# Patient Record
Sex: FEMALE | Race: WHITE | NOT HISPANIC OR LATINO | URBAN - METROPOLITAN AREA
[De-identification: names, ages, dates, MRNs, and addresses within clinical notes are randomized per-mention and may not be internally consistent; named-entity substitution may affect disease eponyms.]

---

## 2018-05-04 ENCOUNTER — EMERGENCY (EMERGENCY)
Facility: HOSPITAL | Age: 73
LOS: 1 days | Discharge: HOME | End: 2018-05-04
Attending: EMERGENCY MEDICINE
Payer: MEDICARE

## 2018-05-04 VITALS
SYSTOLIC BLOOD PRESSURE: 171 MMHG | HEIGHT: 62 IN | RESPIRATION RATE: 16 BRPM | TEMPERATURE: 99 F | WEIGHT: 123.9 LBS | DIASTOLIC BLOOD PRESSURE: 81 MMHG | HEART RATE: 79 BPM | OXYGEN SATURATION: 97 %

## 2018-05-04 VITALS
DIASTOLIC BLOOD PRESSURE: 61 MMHG | RESPIRATION RATE: 18 BRPM | TEMPERATURE: 98 F | OXYGEN SATURATION: 98 % | HEART RATE: 64 BPM | SYSTOLIC BLOOD PRESSURE: 149 MMHG

## 2018-05-04 LAB
ALBUMIN SERPL ELPH-MCNC: 4 G/DL — SIGNIFICANT CHANGE UP (ref 3.5–5)
ALP SERPL-CCNC: 112 U/L — SIGNIFICANT CHANGE UP (ref 40–120)
ALT FLD-CCNC: 33 U/L DA — SIGNIFICANT CHANGE UP (ref 10–60)
ANION GAP SERPL CALC-SCNC: 8 MMOL/L — SIGNIFICANT CHANGE UP (ref 5–17)
APPEARANCE UR: CLEAR — SIGNIFICANT CHANGE UP
AST SERPL-CCNC: 27 U/L — SIGNIFICANT CHANGE UP (ref 10–40)
BASOPHILS # BLD AUTO: 0.1 K/UL — SIGNIFICANT CHANGE UP (ref 0–0.2)
BASOPHILS NFR BLD AUTO: 1.3 % — SIGNIFICANT CHANGE UP (ref 0–2)
BILIRUB SERPL-MCNC: 0.4 MG/DL — SIGNIFICANT CHANGE UP (ref 0.2–1.2)
BILIRUB UR-MCNC: NEGATIVE — SIGNIFICANT CHANGE UP
BUN SERPL-MCNC: 12 MG/DL — SIGNIFICANT CHANGE UP (ref 7–18)
CALCIUM SERPL-MCNC: 9.1 MG/DL — SIGNIFICANT CHANGE UP (ref 8.4–10.5)
CHLORIDE SERPL-SCNC: 104 MMOL/L — SIGNIFICANT CHANGE UP (ref 96–108)
CO2 SERPL-SCNC: 27 MMOL/L — SIGNIFICANT CHANGE UP (ref 22–31)
COLOR SPEC: YELLOW — SIGNIFICANT CHANGE UP
CREAT SERPL-MCNC: 0.71 MG/DL — SIGNIFICANT CHANGE UP (ref 0.5–1.3)
DIFF PNL FLD: ABNORMAL
EOSINOPHIL # BLD AUTO: 0 K/UL — SIGNIFICANT CHANGE UP (ref 0–0.5)
EOSINOPHIL NFR BLD AUTO: 0.3 % — SIGNIFICANT CHANGE UP (ref 0–6)
GLUCOSE SERPL-MCNC: 96 MG/DL — SIGNIFICANT CHANGE UP (ref 70–99)
GLUCOSE UR QL: NEGATIVE — SIGNIFICANT CHANGE UP
HCT VFR BLD CALC: 39.3 % — SIGNIFICANT CHANGE UP (ref 34.5–45)
HGB BLD-MCNC: 11.3 G/DL — LOW (ref 11.5–15.5)
KETONES UR-MCNC: NEGATIVE — SIGNIFICANT CHANGE UP
LEUKOCYTE ESTERASE UR-ACNC: NEGATIVE — SIGNIFICANT CHANGE UP
LIDOCAIN IGE QN: 98 U/L — SIGNIFICANT CHANGE UP (ref 73–393)
LYMPHOCYTES # BLD AUTO: 1.5 K/UL — SIGNIFICANT CHANGE UP (ref 1–3.3)
LYMPHOCYTES # BLD AUTO: 29.7 % — SIGNIFICANT CHANGE UP (ref 13–44)
MCHC RBC-ENTMCNC: 19.4 PG — LOW (ref 27–34)
MCHC RBC-ENTMCNC: 28.7 GM/DL — LOW (ref 32–36)
MCV RBC AUTO: 67.6 FL — LOW (ref 80–100)
MONOCYTES # BLD AUTO: 0.3 K/UL — SIGNIFICANT CHANGE UP (ref 0–0.9)
MONOCYTES NFR BLD AUTO: 6.5 % — SIGNIFICANT CHANGE UP (ref 2–14)
NEUTROPHILS # BLD AUTO: 3.2 K/UL — SIGNIFICANT CHANGE UP (ref 1.8–7.4)
NEUTROPHILS NFR BLD AUTO: 62.3 % — SIGNIFICANT CHANGE UP (ref 43–77)
NITRITE UR-MCNC: NEGATIVE — SIGNIFICANT CHANGE UP
PH UR: 7 — SIGNIFICANT CHANGE UP (ref 5–8)
PLATELET # BLD AUTO: 221 K/UL — SIGNIFICANT CHANGE UP (ref 150–400)
POTASSIUM SERPL-MCNC: 4.6 MMOL/L — SIGNIFICANT CHANGE UP (ref 3.5–5.3)
POTASSIUM SERPL-SCNC: 4.6 MMOL/L — SIGNIFICANT CHANGE UP (ref 3.5–5.3)
PROT SERPL-MCNC: 8.2 G/DL — SIGNIFICANT CHANGE UP (ref 6–8.3)
PROT UR-MCNC: NEGATIVE — SIGNIFICANT CHANGE UP
RBC # BLD: 5.82 M/UL — HIGH (ref 3.8–5.2)
RBC # FLD: 14 % — SIGNIFICANT CHANGE UP (ref 10.3–14.5)
SODIUM SERPL-SCNC: 139 MMOL/L — SIGNIFICANT CHANGE UP (ref 135–145)
SP GR SPEC: 1.01 — SIGNIFICANT CHANGE UP (ref 1.01–1.02)
UROBILINOGEN FLD QL: NEGATIVE — SIGNIFICANT CHANGE UP
WBC # BLD: 5.2 K/UL — SIGNIFICANT CHANGE UP (ref 3.8–10.5)
WBC # FLD AUTO: 5.2 K/UL — SIGNIFICANT CHANGE UP (ref 3.8–10.5)

## 2018-05-04 PROCEDURE — 99284 EMERGENCY DEPT VISIT MOD MDM: CPT | Mod: 25

## 2018-05-04 PROCEDURE — 80053 COMPREHEN METABOLIC PANEL: CPT

## 2018-05-04 PROCEDURE — 83690 ASSAY OF LIPASE: CPT

## 2018-05-04 PROCEDURE — 81001 URINALYSIS AUTO W/SCOPE: CPT

## 2018-05-04 PROCEDURE — 96374 THER/PROPH/DIAG INJ IV PUSH: CPT | Mod: XU

## 2018-05-04 PROCEDURE — 85027 COMPLETE CBC AUTOMATED: CPT

## 2018-05-04 PROCEDURE — 74176 CT ABD & PELVIS W/O CONTRAST: CPT

## 2018-05-04 PROCEDURE — 96375 TX/PRO/DX INJ NEW DRUG ADDON: CPT

## 2018-05-04 PROCEDURE — 99284 EMERGENCY DEPT VISIT MOD MDM: CPT

## 2018-05-04 PROCEDURE — 74176 CT ABD & PELVIS W/O CONTRAST: CPT | Mod: 26

## 2018-05-04 RX ORDER — SODIUM CHLORIDE 9 MG/ML
3 INJECTION INTRAMUSCULAR; INTRAVENOUS; SUBCUTANEOUS ONCE
Qty: 0 | Refills: 0 | Status: COMPLETED | OUTPATIENT
Start: 2018-05-04 | End: 2018-05-04

## 2018-05-04 RX ORDER — SODIUM CHLORIDE 9 MG/ML
1000 INJECTION INTRAMUSCULAR; INTRAVENOUS; SUBCUTANEOUS ONCE
Qty: 0 | Refills: 0 | Status: COMPLETED | OUTPATIENT
Start: 2018-05-04 | End: 2018-05-04

## 2018-05-04 RX ORDER — KETOROLAC TROMETHAMINE 30 MG/ML
15 SYRINGE (ML) INJECTION ONCE
Qty: 0 | Refills: 0 | Status: DISCONTINUED | OUTPATIENT
Start: 2018-05-04 | End: 2018-05-04

## 2018-05-04 RX ORDER — TRAMADOL HYDROCHLORIDE 50 MG/1
1 TABLET ORAL
Qty: 18 | Refills: 0
Start: 2018-05-04 | End: 2018-05-06

## 2018-05-04 RX ORDER — FAMOTIDINE 10 MG/ML
20 INJECTION INTRAVENOUS ONCE
Qty: 0 | Refills: 0 | Status: COMPLETED | OUTPATIENT
Start: 2018-05-04 | End: 2018-05-04

## 2018-05-04 RX ORDER — ONDANSETRON 8 MG/1
4 TABLET, FILM COATED ORAL ONCE
Qty: 0 | Refills: 0 | Status: COMPLETED | OUTPATIENT
Start: 2018-05-04 | End: 2018-05-04

## 2018-05-04 RX ORDER — ACYCLOVIR SODIUM 500 MG
1 VIAL (EA) INTRAVENOUS
Qty: 35 | Refills: 0
Start: 2018-05-04 | End: 2018-05-10

## 2018-05-04 RX ORDER — MORPHINE SULFATE 50 MG/1
4 CAPSULE, EXTENDED RELEASE ORAL ONCE
Qty: 0 | Refills: 0 | Status: DISCONTINUED | OUTPATIENT
Start: 2018-05-04 | End: 2018-05-04

## 2018-05-04 RX ADMIN — MORPHINE SULFATE 4 MILLIGRAM(S): 50 CAPSULE, EXTENDED RELEASE ORAL at 07:09

## 2018-05-04 RX ADMIN — SODIUM CHLORIDE 1000 MILLILITER(S): 9 INJECTION INTRAMUSCULAR; INTRAVENOUS; SUBCUTANEOUS at 06:40

## 2018-05-04 RX ADMIN — SODIUM CHLORIDE 3 MILLILITER(S): 9 INJECTION INTRAMUSCULAR; INTRAVENOUS; SUBCUTANEOUS at 06:37

## 2018-05-04 RX ADMIN — FAMOTIDINE 104 MILLIGRAM(S): 10 INJECTION INTRAVENOUS at 06:43

## 2018-05-04 RX ADMIN — MORPHINE SULFATE 4 MILLIGRAM(S): 50 CAPSULE, EXTENDED RELEASE ORAL at 06:39

## 2018-05-04 RX ADMIN — Medication 15 MILLIGRAM(S): at 07:09

## 2018-05-04 RX ADMIN — Medication 15 MILLIGRAM(S): at 06:39

## 2018-05-04 RX ADMIN — ONDANSETRON 4 MILLIGRAM(S): 8 TABLET, FILM COATED ORAL at 06:39

## 2018-05-04 NOTE — ED PROVIDER NOTE - OBJECTIVE STATEMENT
72 year old female came to the ED because of right flank/abd pain. The pain started 1 day ago and she was seen by her pmd. She describes the pain as burning and is unable to find a comfortable position when she has the pain. The pain is associated with nausea. No fever, no rash, no urinary complaints, no chills, no vomiting, no chest pain, no sob, no back pain. She has had colonoscopy and endoscopy in the past.

## 2018-05-04 NOTE — ED PROVIDER NOTE - PROGRESS NOTE DETAILS
Pt with left abd pain, says her skin is burning, no rash, but likely shingles Pt with left abd pain, says her skin is burning, no rash, but likely shingles. She says that she always has some microscopic blood in the urine. Will treat for shingles and recommend close follow up with pmd.

## 2018-05-05 ENCOUNTER — EMERGENCY (EMERGENCY)
Facility: HOSPITAL | Age: 73
LOS: 1 days | Discharge: HOME | End: 2018-05-05
Attending: EMERGENCY MEDICINE
Payer: MEDICARE

## 2018-05-05 VITALS
DIASTOLIC BLOOD PRESSURE: 60 MMHG | OXYGEN SATURATION: 99 % | RESPIRATION RATE: 16 BRPM | SYSTOLIC BLOOD PRESSURE: 140 MMHG | HEART RATE: 73 BPM | TEMPERATURE: 98 F

## 2018-05-05 VITALS
SYSTOLIC BLOOD PRESSURE: 158 MMHG | OXYGEN SATURATION: 98 % | DIASTOLIC BLOOD PRESSURE: 72 MMHG | TEMPERATURE: 98 F | HEART RATE: 69 BPM | RESPIRATION RATE: 18 BRPM

## 2018-05-05 LAB
ALBUMIN SERPL ELPH-MCNC: 4 G/DL — SIGNIFICANT CHANGE UP (ref 3.5–5)
ALP SERPL-CCNC: 118 U/L — SIGNIFICANT CHANGE UP (ref 40–120)
ALT FLD-CCNC: 27 U/L DA — SIGNIFICANT CHANGE UP (ref 10–60)
ANION GAP SERPL CALC-SCNC: 7 MMOL/L — SIGNIFICANT CHANGE UP (ref 5–17)
APPEARANCE UR: CLEAR — SIGNIFICANT CHANGE UP
AST SERPL-CCNC: 24 U/L — SIGNIFICANT CHANGE UP (ref 10–40)
BASOPHILS # BLD AUTO: 0.1 K/UL — SIGNIFICANT CHANGE UP (ref 0–0.2)
BASOPHILS NFR BLD AUTO: 0.6 % — SIGNIFICANT CHANGE UP (ref 0–2)
BILIRUB SERPL-MCNC: 0.7 MG/DL — SIGNIFICANT CHANGE UP (ref 0.2–1.2)
BILIRUB UR-MCNC: NEGATIVE — SIGNIFICANT CHANGE UP
BUN SERPL-MCNC: 14 MG/DL — SIGNIFICANT CHANGE UP (ref 7–18)
CALCIUM SERPL-MCNC: 9.2 MG/DL — SIGNIFICANT CHANGE UP (ref 8.4–10.5)
CHLORIDE SERPL-SCNC: 96 MMOL/L — SIGNIFICANT CHANGE UP (ref 96–108)
CO2 SERPL-SCNC: 28 MMOL/L — SIGNIFICANT CHANGE UP (ref 22–31)
COLOR SPEC: YELLOW — SIGNIFICANT CHANGE UP
CREAT SERPL-MCNC: 0.65 MG/DL — SIGNIFICANT CHANGE UP (ref 0.5–1.3)
DIFF PNL FLD: ABNORMAL
EOSINOPHIL # BLD AUTO: 0 K/UL — SIGNIFICANT CHANGE UP (ref 0–0.5)
EOSINOPHIL NFR BLD AUTO: 0.3 % — SIGNIFICANT CHANGE UP (ref 0–6)
GLUCOSE SERPL-MCNC: 116 MG/DL — HIGH (ref 70–99)
GLUCOSE UR QL: NEGATIVE — SIGNIFICANT CHANGE UP
HCT VFR BLD CALC: 40.2 % — SIGNIFICANT CHANGE UP (ref 34.5–45)
HGB BLD-MCNC: 11.9 G/DL — SIGNIFICANT CHANGE UP (ref 11.5–15.5)
KETONES UR-MCNC: ABNORMAL
LEUKOCYTE ESTERASE UR-ACNC: ABNORMAL
LIDOCAIN IGE QN: 96 U/L — SIGNIFICANT CHANGE UP (ref 73–393)
LYMPHOCYTES # BLD AUTO: 1.9 K/UL — SIGNIFICANT CHANGE UP (ref 1–3.3)
LYMPHOCYTES # BLD AUTO: 18.3 % — SIGNIFICANT CHANGE UP (ref 13–44)
MAGNESIUM SERPL-MCNC: 2 MG/DL — SIGNIFICANT CHANGE UP (ref 1.6–2.6)
MCHC RBC-ENTMCNC: 19.8 PG — LOW (ref 27–34)
MCHC RBC-ENTMCNC: 29.6 GM/DL — LOW (ref 32–36)
MCV RBC AUTO: 67 FL — LOW (ref 80–100)
MONOCYTES # BLD AUTO: 0.6 K/UL — SIGNIFICANT CHANGE UP (ref 0–0.9)
MONOCYTES NFR BLD AUTO: 5.3 % — SIGNIFICANT CHANGE UP (ref 2–14)
NEUTROPHILS # BLD AUTO: 7.8 K/UL — HIGH (ref 1.8–7.4)
NEUTROPHILS NFR BLD AUTO: 75.5 % — SIGNIFICANT CHANGE UP (ref 43–77)
NITRITE UR-MCNC: NEGATIVE — SIGNIFICANT CHANGE UP
PH UR: 5 — SIGNIFICANT CHANGE UP (ref 5–8)
PHOSPHATE SERPL-MCNC: 2.8 MG/DL — SIGNIFICANT CHANGE UP (ref 2.5–4.5)
PLATELET # BLD AUTO: 271 K/UL — SIGNIFICANT CHANGE UP (ref 150–400)
POTASSIUM SERPL-MCNC: 4.1 MMOL/L — SIGNIFICANT CHANGE UP (ref 3.5–5.3)
POTASSIUM SERPL-SCNC: 4.1 MMOL/L — SIGNIFICANT CHANGE UP (ref 3.5–5.3)
PROT SERPL-MCNC: 8.5 G/DL — HIGH (ref 6–8.3)
PROT UR-MCNC: 15
RBC # BLD: 6 M/UL — HIGH (ref 3.8–5.2)
RBC # FLD: 13.7 % — SIGNIFICANT CHANGE UP (ref 10.3–14.5)
SODIUM SERPL-SCNC: 131 MMOL/L — LOW (ref 135–145)
SP GR SPEC: 1.03 — HIGH (ref 1.01–1.02)
UROBILINOGEN FLD QL: NEGATIVE — SIGNIFICANT CHANGE UP
WBC # BLD: 10.2 K/UL — SIGNIFICANT CHANGE UP (ref 3.8–10.5)
WBC # FLD AUTO: 10.2 K/UL — SIGNIFICANT CHANGE UP (ref 3.8–10.5)

## 2018-05-05 PROCEDURE — 83735 ASSAY OF MAGNESIUM: CPT

## 2018-05-05 PROCEDURE — 83690 ASSAY OF LIPASE: CPT

## 2018-05-05 PROCEDURE — 85027 COMPLETE CBC AUTOMATED: CPT

## 2018-05-05 PROCEDURE — 74177 CT ABD & PELVIS W/CONTRAST: CPT | Mod: 26

## 2018-05-05 PROCEDURE — 74177 CT ABD & PELVIS W/CONTRAST: CPT

## 2018-05-05 PROCEDURE — 99285 EMERGENCY DEPT VISIT HI MDM: CPT

## 2018-05-05 PROCEDURE — 99284 EMERGENCY DEPT VISIT MOD MDM: CPT | Mod: 25

## 2018-05-05 PROCEDURE — 96375 TX/PRO/DX INJ NEW DRUG ADDON: CPT

## 2018-05-05 PROCEDURE — 81001 URINALYSIS AUTO W/SCOPE: CPT

## 2018-05-05 PROCEDURE — 84100 ASSAY OF PHOSPHORUS: CPT

## 2018-05-05 PROCEDURE — 96374 THER/PROPH/DIAG INJ IV PUSH: CPT

## 2018-05-05 PROCEDURE — 80053 COMPREHEN METABOLIC PANEL: CPT

## 2018-05-05 RX ORDER — SODIUM CHLORIDE 9 MG/ML
1000 INJECTION INTRAMUSCULAR; INTRAVENOUS; SUBCUTANEOUS ONCE
Qty: 0 | Refills: 0 | Status: COMPLETED | OUTPATIENT
Start: 2018-05-05 | End: 2018-05-05

## 2018-05-05 RX ORDER — FAMOTIDINE 10 MG/ML
20 INJECTION INTRAVENOUS ONCE
Qty: 0 | Refills: 0 | Status: DISCONTINUED | OUTPATIENT
Start: 2018-05-05 | End: 2018-05-05

## 2018-05-05 RX ORDER — ONDANSETRON 8 MG/1
4 TABLET, FILM COATED ORAL ONCE
Qty: 0 | Refills: 0 | Status: COMPLETED | OUTPATIENT
Start: 2018-05-05 | End: 2018-05-05

## 2018-05-05 RX ORDER — FAMOTIDINE 10 MG/ML
20 INJECTION INTRAVENOUS ONCE
Qty: 0 | Refills: 0 | Status: COMPLETED | OUTPATIENT
Start: 2018-05-05 | End: 2018-05-05

## 2018-05-05 RX ADMIN — SODIUM CHLORIDE 1000 MILLILITER(S): 9 INJECTION INTRAMUSCULAR; INTRAVENOUS; SUBCUTANEOUS at 20:34

## 2018-05-05 RX ADMIN — ONDANSETRON 4 MILLIGRAM(S): 8 TABLET, FILM COATED ORAL at 20:42

## 2018-05-05 RX ADMIN — FAMOTIDINE 104 MILLIGRAM(S): 10 INJECTION INTRAVENOUS at 20:42

## 2018-05-05 NOTE — ED PROVIDER NOTE - MEDICAL DECISION MAKING DETAILS
73 y/o F pt presents with persistent abdominal pain and vomiting. Will r/o diverticulitis versus colitis. Will check labs. Pt to obtain CT abd/pelvis. Will administer IVFs, GI cocktail, pain medications and reassess.

## 2018-05-05 NOTE — ED PROVIDER NOTE - PROGRESS NOTE DETAILS
CT negative for acute pathology.  Pt already on anti viral medication for possible early shingles.  pt advised to continue medication and follow up with pmd.

## 2018-05-05 NOTE — ED PROVIDER NOTE - OBJECTIVE STATEMENT
73 y/o F pt with PMHx of GERD and HLD and no significant PSHx presents to the ED with persistent vomiting since yesterday. Pt was seen in hospital yesterday for abdominal pain, nausea and vomiting; non-contrast CT scan of abdomen and pelvis was negative for kidney stones, and pt was told she possibly has early shingles and discharged with anti-viral medications. Pt reports abdominal pain has been greater on the left side. Pt is now with persistent abdominal pain and vomiting. Pt reports inability to keep down food. Pt denies rash, or any other complaints. NKDA.

## 2018-05-05 NOTE — ED ADULT NURSE NOTE - OBJECTIVE STATEMENT
states  came today due to vomiting since yesterday denies abdominal pain, denies diarrhea. No active vomiting at this time .

## 2018-05-10 ENCOUNTER — EMERGENCY (EMERGENCY)
Facility: HOSPITAL | Age: 73
LOS: 1 days | Discharge: HOME | End: 2018-05-10
Attending: EMERGENCY MEDICINE
Payer: MEDICARE

## 2018-05-10 VITALS
DIASTOLIC BLOOD PRESSURE: 81 MMHG | HEART RATE: 106 BPM | RESPIRATION RATE: 16 BRPM | WEIGHT: 119.93 LBS | TEMPERATURE: 98 F | OXYGEN SATURATION: 97 % | SYSTOLIC BLOOD PRESSURE: 131 MMHG

## 2018-05-10 VITALS
OXYGEN SATURATION: 98 % | TEMPERATURE: 99 F | SYSTOLIC BLOOD PRESSURE: 146 MMHG | DIASTOLIC BLOOD PRESSURE: 62 MMHG | HEART RATE: 87 BPM | RESPIRATION RATE: 16 BRPM

## 2018-05-10 LAB
ALBUMIN SERPL ELPH-MCNC: 3.7 G/DL — SIGNIFICANT CHANGE UP (ref 3.5–5)
ALP SERPL-CCNC: 124 U/L — HIGH (ref 40–120)
ALT FLD-CCNC: 21 U/L DA — SIGNIFICANT CHANGE UP (ref 10–60)
ANION GAP SERPL CALC-SCNC: 8 MMOL/L — SIGNIFICANT CHANGE UP (ref 5–17)
APPEARANCE UR: CLEAR — SIGNIFICANT CHANGE UP
APTT BLD: 27.9 SEC — SIGNIFICANT CHANGE UP (ref 27.5–37.4)
AST SERPL-CCNC: 19 U/L — SIGNIFICANT CHANGE UP (ref 10–40)
BACTERIA # UR AUTO: NEGATIVE /HPF — SIGNIFICANT CHANGE UP
BASOPHILS # BLD AUTO: 0.1 K/UL — SIGNIFICANT CHANGE UP (ref 0–0.2)
BASOPHILS NFR BLD AUTO: 1 % — SIGNIFICANT CHANGE UP (ref 0–2)
BILIRUB SERPL-MCNC: 0.7 MG/DL — SIGNIFICANT CHANGE UP (ref 0.2–1.2)
BILIRUB UR-MCNC: NEGATIVE — SIGNIFICANT CHANGE UP
BUN SERPL-MCNC: 12 MG/DL — SIGNIFICANT CHANGE UP (ref 7–18)
CALCIUM SERPL-MCNC: 9.3 MG/DL — SIGNIFICANT CHANGE UP (ref 8.4–10.5)
CHLORIDE SERPL-SCNC: 100 MMOL/L — SIGNIFICANT CHANGE UP (ref 96–108)
CO2 SERPL-SCNC: 30 MMOL/L — SIGNIFICANT CHANGE UP (ref 22–31)
COLOR SPEC: YELLOW — SIGNIFICANT CHANGE UP
CREAT SERPL-MCNC: 0.65 MG/DL — SIGNIFICANT CHANGE UP (ref 0.5–1.3)
DIFF PNL FLD: ABNORMAL
EOSINOPHIL # BLD AUTO: 0.1 K/UL — SIGNIFICANT CHANGE UP (ref 0–0.5)
EOSINOPHIL NFR BLD AUTO: 1.4 % — SIGNIFICANT CHANGE UP (ref 0–6)
EPI CELLS # UR: ABNORMAL /HPF
GLUCOSE SERPL-MCNC: 99 MG/DL — SIGNIFICANT CHANGE UP (ref 70–99)
GLUCOSE UR QL: NEGATIVE — SIGNIFICANT CHANGE UP
HCT VFR BLD CALC: 40.3 % — SIGNIFICANT CHANGE UP (ref 34.5–45)
HGB BLD-MCNC: 12.1 G/DL — SIGNIFICANT CHANGE UP (ref 11.5–15.5)
HYPOCHROMIA BLD QL: SLIGHT — SIGNIFICANT CHANGE UP
INR BLD: 1.09 RATIO — SIGNIFICANT CHANGE UP (ref 0.88–1.16)
KETONES UR-MCNC: NEGATIVE — SIGNIFICANT CHANGE UP
LEUKOCYTE ESTERASE UR-ACNC: NEGATIVE — SIGNIFICANT CHANGE UP
LIDOCAIN IGE QN: 104 U/L — SIGNIFICANT CHANGE UP (ref 73–393)
LYMPHOCYTES # BLD AUTO: 1.9 K/UL — SIGNIFICANT CHANGE UP (ref 1–3.3)
LYMPHOCYTES # BLD AUTO: 25.3 % — SIGNIFICANT CHANGE UP (ref 13–44)
MCHC RBC-ENTMCNC: 20.1 PG — LOW (ref 27–34)
MCHC RBC-ENTMCNC: 30 GM/DL — LOW (ref 32–36)
MCV RBC AUTO: 66.8 FL — LOW (ref 80–100)
MICROCYTES BLD QL: SIGNIFICANT CHANGE UP
MONOCYTES # BLD AUTO: 0.7 K/UL — SIGNIFICANT CHANGE UP (ref 0–0.9)
MONOCYTES NFR BLD AUTO: 9.1 % — SIGNIFICANT CHANGE UP (ref 2–14)
NEUTROPHILS # BLD AUTO: 4.8 K/UL — SIGNIFICANT CHANGE UP (ref 1.8–7.4)
NEUTROPHILS NFR BLD AUTO: 63.2 % — SIGNIFICANT CHANGE UP (ref 43–77)
NITRITE UR-MCNC: NEGATIVE — SIGNIFICANT CHANGE UP
OVALOCYTES BLD QL SMEAR: SIGNIFICANT CHANGE UP
PH UR: 5 — SIGNIFICANT CHANGE UP (ref 5–8)
PLAT MORPH BLD: NORMAL — SIGNIFICANT CHANGE UP
PLATELET # BLD AUTO: 269 K/UL — SIGNIFICANT CHANGE UP (ref 150–400)
POTASSIUM SERPL-MCNC: 3.4 MMOL/L — LOW (ref 3.5–5.3)
POTASSIUM SERPL-SCNC: 3.4 MMOL/L — LOW (ref 3.5–5.3)
PROT SERPL-MCNC: 8.6 G/DL — HIGH (ref 6–8.3)
PROT UR-MCNC: NEGATIVE — SIGNIFICANT CHANGE UP
PROTHROM AB SERPL-ACNC: 11.9 SEC — SIGNIFICANT CHANGE UP (ref 9.8–12.7)
RBC # BLD: 6.03 M/UL — HIGH (ref 3.8–5.2)
RBC # FLD: 13.9 % — SIGNIFICANT CHANGE UP (ref 10.3–14.5)
RBC BLD AUTO: ABNORMAL
RBC CASTS # UR COMP ASSIST: NEGATIVE /HPF — SIGNIFICANT CHANGE UP (ref 0–2)
SODIUM SERPL-SCNC: 138 MMOL/L — SIGNIFICANT CHANGE UP (ref 135–145)
SP GR SPEC: 1.02 — SIGNIFICANT CHANGE UP (ref 1.01–1.02)
UROBILINOGEN FLD QL: NEGATIVE — SIGNIFICANT CHANGE UP
WBC # BLD: 7.6 K/UL — SIGNIFICANT CHANGE UP (ref 3.8–10.5)
WBC # FLD AUTO: 7.6 K/UL — SIGNIFICANT CHANGE UP (ref 3.8–10.5)
WBC UR QL: SIGNIFICANT CHANGE UP /HPF (ref 0–5)

## 2018-05-10 PROCEDURE — 96374 THER/PROPH/DIAG INJ IV PUSH: CPT

## 2018-05-10 PROCEDURE — 85027 COMPLETE CBC AUTOMATED: CPT

## 2018-05-10 PROCEDURE — 85730 THROMBOPLASTIN TIME PARTIAL: CPT

## 2018-05-10 PROCEDURE — 81001 URINALYSIS AUTO W/SCOPE: CPT

## 2018-05-10 PROCEDURE — 93005 ELECTROCARDIOGRAM TRACING: CPT

## 2018-05-10 PROCEDURE — 99284 EMERGENCY DEPT VISIT MOD MDM: CPT | Mod: 25

## 2018-05-10 PROCEDURE — 83690 ASSAY OF LIPASE: CPT

## 2018-05-10 PROCEDURE — 99283 EMERGENCY DEPT VISIT LOW MDM: CPT

## 2018-05-10 PROCEDURE — 80053 COMPREHEN METABOLIC PANEL: CPT

## 2018-05-10 PROCEDURE — 85610 PROTHROMBIN TIME: CPT

## 2018-05-10 RX ORDER — SODIUM CHLORIDE 9 MG/ML
1000 INJECTION INTRAMUSCULAR; INTRAVENOUS; SUBCUTANEOUS ONCE
Qty: 0 | Refills: 0 | Status: COMPLETED | OUTPATIENT
Start: 2018-05-10 | End: 2018-05-10

## 2018-05-10 RX ORDER — FAMOTIDINE 10 MG/ML
20 INJECTION INTRAVENOUS ONCE
Qty: 0 | Refills: 0 | Status: DISCONTINUED | OUTPATIENT
Start: 2018-05-10 | End: 2018-05-10

## 2018-05-10 RX ORDER — FAMOTIDINE 10 MG/ML
1 INJECTION INTRAVENOUS
Qty: 14 | Refills: 0
Start: 2018-05-10

## 2018-05-10 RX ORDER — FAMOTIDINE 10 MG/ML
20 INJECTION INTRAVENOUS DAILY
Qty: 0 | Refills: 0 | Status: DISCONTINUED | OUTPATIENT
Start: 2018-05-10 | End: 2018-05-14

## 2018-05-10 RX ADMIN — SODIUM CHLORIDE 1000 MILLILITER(S): 9 INJECTION INTRAMUSCULAR; INTRAVENOUS; SUBCUTANEOUS at 09:19

## 2018-05-10 RX ADMIN — FAMOTIDINE 104 MILLIGRAM(S): 10 INJECTION INTRAVENOUS at 09:43

## 2018-05-10 RX ADMIN — Medication 10 MILLILITER(S): at 09:21

## 2018-05-10 RX ADMIN — Medication 30 MILLILITER(S): at 09:19

## 2018-05-10 NOTE — ED ADULT NURSE NOTE - CAS EDN DISCHARGE ASSESSMENT
Alert and oriented to person, place and time/Symptoms improved/Awake/No adverse reaction to first time med in ED

## 2018-05-10 NOTE — ED ADULT NURSE NOTE - CHPI ED SYMPTOMS NEG
no vomiting/no fever/no blood in stool/no burning urination/no diarrhea/no chills/no abdominal distension/no dysuria/no hematuria

## 2018-05-10 NOTE — ED PROVIDER NOTE - OBJECTIVE STATEMENT
71 y/o F pt with a significant PMHx of migraines and no significant PSHx presents to the ED c/o 1 week of burning pain to L side. Pt was seen twice in the ED and initially diagnosed with "internal shingles". Pt was started on antiviral meds which pt states somewhat helped her pain, but pt also feels as if her meds may have hurt her stomach. Pt vomited during previous ED visits and is currently c/o pain; pt is on Tramadol Pt denies fever, chills, diarrhea, cough, chest pain or any other complaints. NKDA.

## 2018-05-10 NOTE — ED ADULT NURSE NOTE - OBJECTIVE STATEMENT
Pt states has been seen here x 2 days this week, was discharged home with internal shingles, states has stomach pain due to meds//Valtrex  Medicated pt, verbalizes feeling better

## 2018-05-10 NOTE — ED PROVIDER NOTE - MEDICAL DECISION MAKING DETAILS
73 y/o F pt presents with L sided pain; concerned for shingles/ gastritis. Will give GI cocktail, do labs and reassess.

## 2019-08-30 ENCOUNTER — INPATIENT (INPATIENT)
Facility: HOSPITAL | Age: 74
LOS: 3 days | Discharge: HOME | DRG: 872 | End: 2019-09-03
Attending: INTERNAL MEDICINE | Admitting: INTERNAL MEDICINE
Payer: MEDICARE

## 2019-08-30 VITALS
SYSTOLIC BLOOD PRESSURE: 129 MMHG | TEMPERATURE: 100 F | HEIGHT: 62 IN | RESPIRATION RATE: 18 BRPM | HEART RATE: 123 BPM | OXYGEN SATURATION: 98 % | DIASTOLIC BLOOD PRESSURE: 74 MMHG | WEIGHT: 113.98 LBS

## 2019-08-30 DIAGNOSIS — R26.81 UNSTEADINESS ON FEET: ICD-10-CM

## 2019-08-30 PROBLEM — E78.5 HYPERLIPIDEMIA, UNSPECIFIED: Chronic | Status: ACTIVE | Noted: 2018-05-05

## 2019-08-30 PROBLEM — K21.9 GASTRO-ESOPHAGEAL REFLUX DISEASE WITHOUT ESOPHAGITIS: Chronic | Status: ACTIVE | Noted: 2018-05-05

## 2019-08-30 LAB
ALBUMIN SERPL ELPH-MCNC: 3.4 G/DL — LOW (ref 3.5–5)
ALP SERPL-CCNC: 96 U/L — SIGNIFICANT CHANGE UP (ref 40–120)
ALT FLD-CCNC: 22 U/L DA — SIGNIFICANT CHANGE UP (ref 10–60)
ANION GAP SERPL CALC-SCNC: 5 MMOL/L — SIGNIFICANT CHANGE UP (ref 5–17)
APPEARANCE UR: CLEAR — SIGNIFICANT CHANGE UP
APTT BLD: 30.3 SEC — SIGNIFICANT CHANGE UP (ref 27.5–36.3)
AST SERPL-CCNC: 20 U/L — SIGNIFICANT CHANGE UP (ref 10–40)
BASOPHILS # BLD AUTO: 0 K/UL — SIGNIFICANT CHANGE UP (ref 0–0.2)
BASOPHILS NFR BLD AUTO: 0 % — SIGNIFICANT CHANGE UP (ref 0–2)
BILIRUB SERPL-MCNC: 0.4 MG/DL — SIGNIFICANT CHANGE UP (ref 0.2–1.2)
BILIRUB UR-MCNC: NEGATIVE — SIGNIFICANT CHANGE UP
BUN SERPL-MCNC: 19 MG/DL — HIGH (ref 7–18)
CALCIUM SERPL-MCNC: 8.9 MG/DL — SIGNIFICANT CHANGE UP (ref 8.4–10.5)
CHLORIDE SERPL-SCNC: 102 MMOL/L — SIGNIFICANT CHANGE UP (ref 96–108)
CO2 SERPL-SCNC: 30 MMOL/L — SIGNIFICANT CHANGE UP (ref 22–31)
COLOR SPEC: YELLOW — SIGNIFICANT CHANGE UP
CREAT SERPL-MCNC: 0.9 MG/DL — SIGNIFICANT CHANGE UP (ref 0.5–1.3)
DIFF PNL FLD: ABNORMAL
EOSINOPHIL # BLD AUTO: 0 K/UL — SIGNIFICANT CHANGE UP (ref 0–0.5)
EOSINOPHIL NFR BLD AUTO: 0 % — SIGNIFICANT CHANGE UP (ref 0–6)
GLUCOSE SERPL-MCNC: 134 MG/DL — HIGH (ref 70–99)
GLUCOSE UR QL: NEGATIVE — SIGNIFICANT CHANGE UP
HCT VFR BLD CALC: 32.8 % — LOW (ref 34.5–45)
HGB BLD-MCNC: 10.3 G/DL — LOW (ref 11.5–15.5)
INR BLD: 1.23 RATIO — HIGH (ref 0.88–1.16)
KETONES UR-MCNC: ABNORMAL
LACTATE SERPL-SCNC: 1 MMOL/L — SIGNIFICANT CHANGE UP (ref 0.7–2)
LEUKOCYTE ESTERASE UR-ACNC: ABNORMAL
LIDOCAIN IGE QN: 83 U/L — SIGNIFICANT CHANGE UP (ref 73–393)
LYMPHOCYTES # BLD AUTO: 1.31 K/UL — SIGNIFICANT CHANGE UP (ref 1–3.3)
LYMPHOCYTES # BLD AUTO: 16 % — SIGNIFICANT CHANGE UP (ref 13–44)
MCHC RBC-ENTMCNC: 20.3 PG — LOW (ref 27–34)
MCHC RBC-ENTMCNC: 31.4 GM/DL — LOW (ref 32–36)
MCV RBC AUTO: 64.7 FL — LOW (ref 80–100)
MONOCYTES # BLD AUTO: 0.49 K/UL — SIGNIFICANT CHANGE UP (ref 0–0.9)
MONOCYTES NFR BLD AUTO: 6 % — SIGNIFICANT CHANGE UP (ref 2–14)
NEUTROPHILS # BLD AUTO: 6.39 K/UL — SIGNIFICANT CHANGE UP (ref 1.8–7.4)
NEUTROPHILS NFR BLD AUTO: 74 % — SIGNIFICANT CHANGE UP (ref 43–77)
NITRITE UR-MCNC: NEGATIVE — SIGNIFICANT CHANGE UP
PH UR: 5 — SIGNIFICANT CHANGE UP (ref 5–8)
PLATELET # BLD AUTO: 175 K/UL — SIGNIFICANT CHANGE UP (ref 150–400)
POTASSIUM SERPL-MCNC: 4.3 MMOL/L — SIGNIFICANT CHANGE UP (ref 3.5–5.3)
POTASSIUM SERPL-SCNC: 4.3 MMOL/L — SIGNIFICANT CHANGE UP (ref 3.5–5.3)
PROT SERPL-MCNC: 7.7 G/DL — SIGNIFICANT CHANGE UP (ref 6–8.3)
PROT UR-MCNC: 30 MG/DL
PROTHROM AB SERPL-ACNC: 13.7 SEC — HIGH (ref 10–12.9)
RBC # BLD: 5.07 M/UL — SIGNIFICANT CHANGE UP (ref 3.8–5.2)
RBC # FLD: 15.6 % — HIGH (ref 10.3–14.5)
SODIUM SERPL-SCNC: 137 MMOL/L — SIGNIFICANT CHANGE UP (ref 135–145)
SP GR SPEC: 1.01 — SIGNIFICANT CHANGE UP (ref 1.01–1.02)
TROPONIN I SERPL-MCNC: <0.015 NG/ML — SIGNIFICANT CHANGE UP (ref 0–0.04)
UROBILINOGEN FLD QL: NEGATIVE — SIGNIFICANT CHANGE UP
WBC # BLD: 8.19 K/UL — SIGNIFICANT CHANGE UP (ref 3.8–10.5)
WBC # FLD AUTO: 8.19 K/UL — SIGNIFICANT CHANGE UP (ref 3.8–10.5)

## 2019-08-30 PROCEDURE — 70498 CT ANGIOGRAPHY NECK: CPT | Mod: 26

## 2019-08-30 PROCEDURE — 71045 X-RAY EXAM CHEST 1 VIEW: CPT | Mod: 26

## 2019-08-30 PROCEDURE — 99285 EMERGENCY DEPT VISIT HI MDM: CPT

## 2019-08-30 PROCEDURE — 70496 CT ANGIOGRAPHY HEAD: CPT | Mod: 26

## 2019-08-30 RX ORDER — VANCOMYCIN HCL 1 G
1000 VIAL (EA) INTRAVENOUS ONCE
Refills: 0 | Status: COMPLETED | OUTPATIENT
Start: 2019-08-30 | End: 2019-08-30

## 2019-08-30 RX ORDER — ASPIRIN/CALCIUM CARB/MAGNESIUM 324 MG
325 TABLET ORAL ONCE
Refills: 0 | Status: COMPLETED | OUTPATIENT
Start: 2019-08-30 | End: 2019-08-30

## 2019-08-30 RX ORDER — PIPERACILLIN AND TAZOBACTAM 4; .5 G/20ML; G/20ML
3.38 INJECTION, POWDER, LYOPHILIZED, FOR SOLUTION INTRAVENOUS ONCE
Refills: 0 | Status: COMPLETED | OUTPATIENT
Start: 2019-08-30 | End: 2019-08-30

## 2019-08-30 RX ORDER — SODIUM CHLORIDE 9 MG/ML
1000 INJECTION INTRAMUSCULAR; INTRAVENOUS; SUBCUTANEOUS ONCE
Refills: 0 | Status: DISCONTINUED | OUTPATIENT
Start: 2019-08-30 | End: 2019-08-30

## 2019-08-30 RX ORDER — ACETAMINOPHEN 500 MG
975 TABLET ORAL ONCE
Refills: 0 | Status: COMPLETED | OUTPATIENT
Start: 2019-08-30 | End: 2019-08-30

## 2019-08-30 RX ORDER — HEPARIN SODIUM 5000 [USP'U]/ML
5000 INJECTION INTRAVENOUS; SUBCUTANEOUS EVERY 12 HOURS
Refills: 0 | Status: DISCONTINUED | OUTPATIENT
Start: 2019-08-30 | End: 2019-09-03

## 2019-08-30 RX ORDER — SODIUM CHLORIDE 9 MG/ML
1600 INJECTION INTRAMUSCULAR; INTRAVENOUS; SUBCUTANEOUS ONCE
Refills: 0 | Status: COMPLETED | OUTPATIENT
Start: 2019-08-30 | End: 2019-08-30

## 2019-08-30 RX ORDER — SODIUM CHLORIDE 9 MG/ML
1000 INJECTION INTRAMUSCULAR; INTRAVENOUS; SUBCUTANEOUS
Refills: 0 | Status: DISCONTINUED | OUTPATIENT
Start: 2019-08-30 | End: 2019-09-02

## 2019-08-30 RX ADMIN — Medication 325 MILLIGRAM(S): at 21:33

## 2019-08-30 RX ADMIN — Medication 975 MILLIGRAM(S): at 16:57

## 2019-08-30 RX ADMIN — Medication 975 MILLIGRAM(S): at 19:21

## 2019-08-30 RX ADMIN — SODIUM CHLORIDE 1600 MILLILITER(S): 9 INJECTION INTRAMUSCULAR; INTRAVENOUS; SUBCUTANEOUS at 18:18

## 2019-08-30 RX ADMIN — SODIUM CHLORIDE 1600 MILLILITER(S): 9 INJECTION INTRAMUSCULAR; INTRAVENOUS; SUBCUTANEOUS at 16:59

## 2019-08-30 RX ADMIN — Medication 250 MILLIGRAM(S): at 18:18

## 2019-08-30 RX ADMIN — PIPERACILLIN AND TAZOBACTAM 200 GRAM(S): 4; .5 INJECTION, POWDER, LYOPHILIZED, FOR SOLUTION INTRAVENOUS at 16:57

## 2019-08-30 NOTE — H&P ADULT - NSHPPHYSICALEXAM_GEN_ALL_CORE
Vital Signs Last 24 Hrs  T(C): 36.9 (31 Aug 2019 01:00), Max: 38.9 (30 Aug 2019 16:17)  T(F): 98.5 (31 Aug 2019 01:00), Max: 102.1 (30 Aug 2019 16:17)  HR: 98 (31 Aug 2019 01:00) (94 - 123)  BP: 108/67 (31 Aug 2019 01:00) (108/67 - 129/74)  BP(mean): --  RR: 18 (31 Aug 2019 01:00) (18 - 20)  SpO2: 98% (31 Aug 2019 01:00) (98% - 100%)    PHYSICAL EXAM:  GENERAL: NAD, well-developed  HEAD:  Atraumatic, Normocephalic  EYES: EOMI, PERRLA, conjunctiva and sclera clear  NECK: Supple, No JVD  CHEST/LUNG: Clear to auscultation bilaterally; No wheeze  HEART: Regular rate and rhythm; s1+ s2+  ABDOMEN: Soft, Nontender, Nondistended; Bowel sounds present  EXTREMITIES:, No clubbing, cyanosis, or edema  NEUROLOGY:AAOx3 non-focal  SKIN: No rashes or lesions

## 2019-08-30 NOTE — H&P ADULT - PROBLEM SELECTOR PLAN 6
IMPROVE VTE Individual Risk Assessment  RISK                                                         Points  [  ] Previous VTE                                      3  [  ] Thrombophilia                                   2  [  ] Lower limb paralysis                         2 (unable to hold up >15 seconds)    [  ] Current Cancer                                  2       (within 6 months)  [  ] Immobilization > 24 hrs                    1  [  ] ICU/CCU stay > 24 hrs                         1  [ x ] Age > 60                                              1  IMPROVE VTE Score 1  c/w heparin sq for dvt ppx

## 2019-08-30 NOTE — ED PROVIDER NOTE - PROGRESS NOTE DETAILS
Castillo: s/o from Dr jacques to f/u cta head and neck.  imagining neg.  pt ferbile. sepsis initiated.  pt feels better after hydration but still unsteady.    admit to med  tele for r/o cva due to unsteady gait.  dysphagia pass. nih 0

## 2019-08-30 NOTE — H&P ADULT - ASSESSMENT
Patient is 74 year old female has past medical history of migraines, hld, chronic urinary hesitancy was sent from pcp because of fever associated with generalized weakness and difficulty walking.     ED course pt was code sepsis in the ED, temperature and tachycardia, lactate was normal, blood and urine was sent, u/a negative, pt got IV Fluid and zosyn and vancomycin, after getting IVF in the ED, pt states she feels better and she does not have weakness anymore.

## 2019-08-30 NOTE — H&P ADULT - HISTORY OF PRESENT ILLNESS
Patient is 74 year old female has past medical history of migraines, hld, chronic urinary hesitancy was sent from pcp because of fever associated with generalized weakness and difficulty walking.   Per pt she was in her usual health a week ago, when she started to have diarrhea, non bloody, watery, pt has have 4 episode of water diarrhea since she came to the hospital, pt started to feel weak since last night but today morning she woke up pt felt very weak and she could not walk properly she was swaying, but never felt she was about to fall. pt went to pcp today morning where she had rectal temp of 102 and ekg was abnormal. pt was sent to ED.  Pt denies dizziness, unusual headache vision problem, ear problem, cough, cp, sob, palpitation, carmpy abdominal pain, no recent travel no recent antibiotic use, no sore throat, no unusual eating, dysuria, hematuria muscle pain or joint pain. Pt states last week she notice mosquito bite on her leg, hand and arm. pt states she was taking flexeril (unknown dose) at bedtime for last 2 weeks and she was not prescribed flexeril she was taking it form relative.    ED course pt was code sepsis in the ED, temperature and tachycardia, lactate was normal, blood and urine was sent, u/a negative, pt got IV Fluid and zosyn and vancomycin, after getting IVF in the ED, pt states she feels better and she does not have weakness anymore.

## 2019-08-30 NOTE — ED ADULT NURSE NOTE - TEMPLATE LIST FOR HEAD TO TOE ASSESSMENT
CLINICAL PHARMACY NOTE: MEDS TO [address] Select Patient?: No  Total # of Prescriptions Filled: 1   The following medications were delivered to the patient:  · Oxycodone/Apap 5/325  Total # of Interventions Completed: 0  Time Spent (min): 15    Additional Documentation: General

## 2019-08-30 NOTE — ED PROVIDER NOTE - CRANIAL NERVE AND PUPILLARY EXAM
-Potassium level normalized, then with hypernatremia likely NS induced, improved with hypotonic IVf cranial nerves 2-12 intact

## 2019-08-30 NOTE — H&P ADULT - PROBLEM SELECTOR PLAN 1
Fever, tachycardia, diarrhea, no leucocytosis, normal lactate   likely from gastroenteritis  pt improved from ivf  u/a neg, cxr neg  will send stool studies   will start on cipro and flagyl since pt has fever and diarrhea has not been resolved   after sending stool studies

## 2019-08-30 NOTE — H&P ADULT - DOES THIS PATIENT HAVE A HISTORY OF OR HAS BEEN DX WITH HEART FAILURE?
no

## 2019-08-30 NOTE — H&P ADULT - PROBLEM SELECTOR PLAN 3
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

## 2019-08-30 NOTE — ED ADULT TRIAGE NOTE - CHIEF COMPLAINT QUOTE
Disoriented and dizzy after mosquito bite last week. Sent by PMD for abnormal EKG and for evaluation

## 2019-08-30 NOTE — ED PROVIDER NOTE - OBJECTIVE STATEMENT
Patient is a 75 yo woman with a past medical history of migraines and HLD presenting to the ED after 3 days of gait instability with associated dizziness. Patient states that about 2 weeks ago she began taking Flexeril (unknown dosage, not prescribed to her) to help with sleep. She states that since she began taking the medication she has felt fatigued and sleepy and in the last 3 days had changes in gait (walking more slowly) due dizziness and instability. Patient went to PCP to evaluate these sx, physician performed EKG and recommended ED visit. Patient endorses diarrhea for the last week and chronic urinary hesitation. Patient denies any headaches (different from her baseline), any recent illness, changes in vision, chest pain, SOB, nausea, dysuria, myalgias, or arthralgias. Patient states that her most recent travel was to [facility]

## 2019-08-31 DIAGNOSIS — A41.9 SEPSIS, UNSPECIFIED ORGANISM: ICD-10-CM

## 2019-08-31 DIAGNOSIS — Z29.9 ENCOUNTER FOR PROPHYLACTIC MEASURES, UNSPECIFIED: ICD-10-CM

## 2019-08-31 DIAGNOSIS — D64.9 ANEMIA, UNSPECIFIED: ICD-10-CM

## 2019-08-31 DIAGNOSIS — R19.7 DIARRHEA, UNSPECIFIED: ICD-10-CM

## 2019-08-31 DIAGNOSIS — E78.5 HYPERLIPIDEMIA, UNSPECIFIED: ICD-10-CM

## 2019-08-31 DIAGNOSIS — R53.1 WEAKNESS: ICD-10-CM

## 2019-08-31 LAB
ALBUMIN SERPL ELPH-MCNC: 2.9 G/DL — LOW (ref 3.5–5)
ALP SERPL-CCNC: 80 U/L — SIGNIFICANT CHANGE UP (ref 40–120)
ALT FLD-CCNC: 18 U/L DA — SIGNIFICANT CHANGE UP (ref 10–60)
ANION GAP SERPL CALC-SCNC: 4 MMOL/L — LOW (ref 5–17)
AST SERPL-CCNC: 18 U/L — SIGNIFICANT CHANGE UP (ref 10–40)
BASOPHILS # BLD AUTO: 0.02 K/UL — SIGNIFICANT CHANGE UP (ref 0–0.2)
BASOPHILS NFR BLD AUTO: 0.3 % — SIGNIFICANT CHANGE UP (ref 0–2)
BILIRUB SERPL-MCNC: 0.5 MG/DL — SIGNIFICANT CHANGE UP (ref 0.2–1.2)
BUN SERPL-MCNC: 11 MG/DL — SIGNIFICANT CHANGE UP (ref 7–18)
C DIFF BY PCR RESULT: SIGNIFICANT CHANGE UP
C DIFF TOX GENS STL QL NAA+PROBE: SIGNIFICANT CHANGE UP
CALCIUM SERPL-MCNC: 8.4 MG/DL — SIGNIFICANT CHANGE UP (ref 8.4–10.5)
CHLORIDE SERPL-SCNC: 103 MMOL/L — SIGNIFICANT CHANGE UP (ref 96–108)
CHOLEST SERPL-MCNC: 111 MG/DL — SIGNIFICANT CHANGE UP (ref 10–199)
CO2 SERPL-SCNC: 29 MMOL/L — SIGNIFICANT CHANGE UP (ref 22–31)
CREAT SERPL-MCNC: 0.68 MG/DL — SIGNIFICANT CHANGE UP (ref 0.5–1.3)
EOSINOPHIL # BLD AUTO: 0.02 K/UL — SIGNIFICANT CHANGE UP (ref 0–0.5)
EOSINOPHIL NFR BLD AUTO: 0.3 % — SIGNIFICANT CHANGE UP (ref 0–6)
FERRITIN SERPL-MCNC: 199 NG/ML — HIGH (ref 15–150)
FOLATE SERPL-MCNC: 18.4 NG/ML — SIGNIFICANT CHANGE UP
GLUCOSE SERPL-MCNC: 90 MG/DL — SIGNIFICANT CHANGE UP (ref 70–99)
HBA1C BLD-MCNC: 5.3 % — SIGNIFICANT CHANGE UP (ref 4–5.6)
HCT VFR BLD CALC: 29.3 % — LOW (ref 34.5–45)
HDLC SERPL-MCNC: 50 MG/DL — SIGNIFICANT CHANGE UP
HGB BLD-MCNC: 9.1 G/DL — LOW (ref 11.5–15.5)
IMM GRANULOCYTES NFR BLD AUTO: 0.3 % — SIGNIFICANT CHANGE UP (ref 0–1.5)
IRON SATN MFR SERPL: 10 UG/DL — LOW (ref 40–150)
IRON SATN MFR SERPL: 6 % — LOW (ref 15–50)
LIPID PNL WITH DIRECT LDL SERPL: 46 MG/DL — SIGNIFICANT CHANGE UP
LYMPHOCYTES # BLD AUTO: 0.99 K/UL — LOW (ref 1–3.3)
LYMPHOCYTES # BLD AUTO: 16.6 % — SIGNIFICANT CHANGE UP (ref 13–44)
MCHC RBC-ENTMCNC: 19.9 PG — LOW (ref 27–34)
MCHC RBC-ENTMCNC: 31.1 GM/DL — LOW (ref 32–36)
MCV RBC AUTO: 64 FL — LOW (ref 80–100)
MONOCYTES # BLD AUTO: 0.5 K/UL — SIGNIFICANT CHANGE UP (ref 0–0.9)
MONOCYTES NFR BLD AUTO: 8.4 % — SIGNIFICANT CHANGE UP (ref 2–14)
NEUTROPHILS # BLD AUTO: 4.41 K/UL — SIGNIFICANT CHANGE UP (ref 1.8–7.4)
NEUTROPHILS NFR BLD AUTO: 74.1 % — SIGNIFICANT CHANGE UP (ref 43–77)
NRBC # BLD: 0 /100 WBCS — SIGNIFICANT CHANGE UP (ref 0–0)
OB PNL STL: POSITIVE
PHOSPHATE SERPL-MCNC: 2.5 MG/DL — SIGNIFICANT CHANGE UP (ref 2.5–4.5)
PLATELET # BLD AUTO: 154 K/UL — SIGNIFICANT CHANGE UP (ref 150–400)
POTASSIUM SERPL-MCNC: 3.9 MMOL/L — SIGNIFICANT CHANGE UP (ref 3.5–5.3)
POTASSIUM SERPL-SCNC: 3.9 MMOL/L — SIGNIFICANT CHANGE UP (ref 3.5–5.3)
PROT SERPL-MCNC: 6.8 G/DL — SIGNIFICANT CHANGE UP (ref 6–8.3)
RBC # BLD: 4.58 M/UL — SIGNIFICANT CHANGE UP (ref 3.8–5.2)
RBC # FLD: 15.6 % — HIGH (ref 10.3–14.5)
SODIUM SERPL-SCNC: 136 MMOL/L — SIGNIFICANT CHANGE UP (ref 135–145)
TIBC SERPL-MCNC: 165 UG/DL — LOW (ref 250–450)
TOTAL CHOLESTEROL/HDL RATIO MEASUREMENT: 2.2 RATIO — LOW (ref 3.3–7.1)
TRIGL SERPL-MCNC: 76 MG/DL — SIGNIFICANT CHANGE UP (ref 10–149)
TSH SERPL-MCNC: 1.28 UU/ML — SIGNIFICANT CHANGE UP (ref 0.34–4.82)
UIBC SERPL-MCNC: 155 UG/DL — SIGNIFICANT CHANGE UP (ref 110–370)
VIT B12 SERPL-MCNC: 508 PG/ML — SIGNIFICANT CHANGE UP (ref 232–1245)
WBC # BLD: 5.96 K/UL — SIGNIFICANT CHANGE UP (ref 3.8–10.5)
WBC # FLD AUTO: 5.96 K/UL — SIGNIFICANT CHANGE UP (ref 3.8–10.5)

## 2019-08-31 PROCEDURE — 99221 1ST HOSP IP/OBS SF/LOW 40: CPT

## 2019-08-31 RX ORDER — METRONIDAZOLE 500 MG
500 TABLET ORAL EVERY 8 HOURS
Refills: 0 | Status: DISCONTINUED | OUTPATIENT
Start: 2019-08-31 | End: 2019-09-02

## 2019-08-31 RX ORDER — METRONIDAZOLE 500 MG
TABLET ORAL
Refills: 0 | Status: DISCONTINUED | OUTPATIENT
Start: 2019-08-31 | End: 2019-09-02

## 2019-08-31 RX ORDER — METRONIDAZOLE 500 MG
500 TABLET ORAL ONCE
Refills: 0 | Status: COMPLETED | OUTPATIENT
Start: 2019-08-31 | End: 2019-08-31

## 2019-08-31 RX ORDER — CIPROFLOXACIN LACTATE 400MG/40ML
400 VIAL (ML) INTRAVENOUS EVERY 12 HOURS
Refills: 0 | Status: DISCONTINUED | OUTPATIENT
Start: 2019-08-31 | End: 2019-09-03

## 2019-08-31 RX ORDER — CIPROFLOXACIN LACTATE 400MG/40ML
400 VIAL (ML) INTRAVENOUS ONCE
Refills: 0 | Status: COMPLETED | OUTPATIENT
Start: 2019-08-31 | End: 2019-08-31

## 2019-08-31 RX ORDER — ATORVASTATIN CALCIUM 80 MG/1
10 TABLET, FILM COATED ORAL AT BEDTIME
Refills: 0 | Status: DISCONTINUED | OUTPATIENT
Start: 2019-08-31 | End: 2019-09-03

## 2019-08-31 RX ORDER — ACETAMINOPHEN 500 MG
650 TABLET ORAL EVERY 6 HOURS
Refills: 0 | Status: DISCONTINUED | OUTPATIENT
Start: 2019-08-31 | End: 2019-09-03

## 2019-08-31 RX ORDER — CIPROFLOXACIN LACTATE 400MG/40ML
VIAL (ML) INTRAVENOUS
Refills: 0 | Status: DISCONTINUED | OUTPATIENT
Start: 2019-08-31 | End: 2019-09-03

## 2019-08-31 RX ADMIN — Medication 200 MILLIGRAM(S): at 17:18

## 2019-08-31 RX ADMIN — Medication 100 MILLIGRAM(S): at 05:43

## 2019-08-31 RX ADMIN — ATORVASTATIN CALCIUM 10 MILLIGRAM(S): 80 TABLET, FILM COATED ORAL at 22:12

## 2019-08-31 RX ADMIN — SODIUM CHLORIDE 75 MILLILITER(S): 9 INJECTION INTRAMUSCULAR; INTRAVENOUS; SUBCUTANEOUS at 22:16

## 2019-08-31 RX ADMIN — SODIUM CHLORIDE 75 MILLILITER(S): 9 INJECTION INTRAMUSCULAR; INTRAVENOUS; SUBCUTANEOUS at 00:26

## 2019-08-31 RX ADMIN — HEPARIN SODIUM 5000 UNIT(S): 5000 INJECTION INTRAVENOUS; SUBCUTANEOUS at 05:43

## 2019-08-31 RX ADMIN — Medication 100 MILLIGRAM(S): at 13:27

## 2019-08-31 RX ADMIN — Medication 100 MILLIGRAM(S): at 22:12

## 2019-08-31 RX ADMIN — Medication 200 MILLIGRAM(S): at 05:42

## 2019-08-31 NOTE — CONSULT NOTE ADULT - SUBJECTIVE AND OBJECTIVE BOX
Patient is a 74y old  Female who presents with a chief complaint of     HPI: Normally walks quite well and long for a 69 year ols but suffered a slowing of gait and unsteadiness after recent diarrhea    PAST MEDICAL & SURGICAL HISTORY:  GERD (gastroesophageal reflux disease)  HLD (hyperlipidemia)  No significant past surgical history      FAMILY HISTORY:        Social Hx:  Nonsmoker, no drug or alcohol use    MEDICATIONS  (STANDING):  atorvastatin 10 milliGRAM(s) Oral at bedtime  ciprofloxacin   IVPB 400 milliGRAM(s) IV Intermittent every 12 hours  ciprofloxacin   IVPB      heparin  Injectable 5000 Unit(s) SubCutaneous every 12 hours  metroNIDAZOLE  IVPB      metroNIDAZOLE  IVPB 500 milliGRAM(s) IV Intermittent every 8 hours  sodium chloride 0.9%. 1000 milliLiter(s) (75 mL/Hr) IV Continuous <Continuous>       Allergies    No Known Allergies    Intolerances        ROS: Pertinent positives in HPI, all other ROS were reviewed and are negative.      Vital Signs Last 24 Hrs  T(C): 36.8 (31 Aug 2019 13:38), Max: 38.2 (31 Aug 2019 05:13)  T(F): 98.3 (31 Aug 2019 13:38), Max: 100.7 (31 Aug 2019 05:13)  HR: 96 (31 Aug 2019 13:38) (94 - 103)  BP: 116/68 (31 Aug 2019 13:38) (108/67 - 120/52)  BP(mean): --  RR: 18 (31 Aug 2019 13:38) (18 - 20)  SpO2: 98% (31 Aug 2019 13:38) (97% - 100%)        Constitutional: awake and alert.  HEENT: PERRLA, EOMI,   Neck: Supple.  Respiratory: Breath sounds are clear bilaterally  Cardiovascular: S1 and S2, regular  rhythm  Gastrointestinal: soft, nontender  Extremities:  no edema  Vascular: Carotid Bruit - no  Musculoskeletal: no joint swelling/tenderness, no abnormal movements  Skin: No rashes    Neurological exam:  HF: A x O x 3. Appropriately interactive, normal affect. Speech fluent, No Aphasia or paraphasic errors. Naming /repetition intact   CN: MAXI, EOMI, VFF, facial sensation normal, no NLFD, tongue midline, Palate moves equally, SCM equal bilaterally  Motor: No pronator drift, Strength 5/5 in all 4 ext, normal bulk and tone, no tremor, rigidity or bradykinesia.    Sens: Intact to light touch / PP/ VS/ JS    Reflexes: Symmetric and normal . BJ 2+, BR 2+, KJ 2+, AJ 2+, downgoing toes b/l  Coord:  No FNFA, dysmetria, MISTI intact   Gait/Balance: Normal/ tandem heel-walking and toe-walking    NIHSS: 0  MRS 0        Labs:                        9.1    5.96  )-----------( 154      ( 31 Aug 2019 06:53 )             29.3     08-31    136  |  103  |  11  ----------------------------<  90  3.9   |  29  |  0.68    Ca    8.4      31 Aug 2019 06:53  Phos  2.5     08-31    TPro  6.8  /  Alb  2.9<L>  /  TBili  0.5  /  DBili  x   /  AST  18  /  ALT  18  /  AlkPhos  80  08-31 08-31 [id number] 5.3    08-31 Chol 111 LDL 46 HDL 50 Trig 76  PT/INR - ( 30 Aug 2019 12:50 )   PT: 13.7 sec;   INR: 1.23 ratio         PTT - ( 30 Aug 2019 12:50 )  PTT:30.3 sec    Radiology report:  - CT head:      < from: CT Angio Head w/ IV Cont (08.30.19 @ 15:43) >  EXAM:  CT ANGIO BRAIN (W)AW IC                          EXAM:  CT ANGIO NECK (W)AW IC                            PROCEDURE DATE:  08/30/2019          INTERPRETATION:  Exam Date: 8/30/2019 3:33 PM    Three examinations were performed on this patient:  1. CT Angiography of the carotid arteries with and without IV contrast   2. CT Angiography of the intracranial circulation with and without IV   contrast      CLINICAL INFORMATION:  abnormal gait    TECHNIQUE:   Preceding intravenous contrast contiguous axial sections   were obtained through the head. CT angiography images at were acquired   from the aortic arch to the vertex of the skull.   Images were acquired   during rapid bolus intravenous administration of 90 mL of Omnipaque 350   contrast with 10 mL discarded.  Post processing angiographic 3D MIP   reconstruction of images was performed. This data set was  reconstructed    and reviewed in multiple projections to evaluate carotid morphology and   intracranial vessels.  This scan was performed using automatic exposure   control (radiation dose reduction software) to obtain a diagnostic image   quality scan with patient dose as low as reasonably achievable.    FINDINGS:   No previous examinations are available for review.    Right retroesophageal subclavian artery present, a normal congenital   variant. The great vessels are otherwise unremarkable.    The carotid circulation is intact without hemodynamically significant   stenosis.   The vertebral arteries are patent.    Intracranial vertebral arteries are intact without evidence of dissection   or hemodynamically significant stenosis. The basilar artery and posterior   cerebral arteries and are normal without hemodynamically significant   stenosis. Bilateral superior cerebellar arteries are intact. The   intracranial internal carotid arteries, middle cerebral arteries, and   anterior cerebral arteries are intact without hemodynamically significant   stenosis.  There is no evidence of aneurysm or vascular malformation.    The brain demonstrates no acute cerebral cortical infarct is seen.  No   evidence of midline shift.  The ventricles, sulci and basal cisterns   appear unremarkable.      The paranasal sinuses are clear.        IMPRESSION:          1.   Right carotidsystem:  No hemodynamically significant stenosis.        2.   Left carotid system:  No hemodynamically significant stenosis.        3.   Intracranial circulation:  No significant vascular lesion.        4.   Brain:  No acute infarct or acute hemorrhage.      TULIO HANDY M.D., ATTENDING RADIOLOGIST  This document has been electronically signed. Aug 30 2019  3:51PM

## 2019-08-31 NOTE — PROGRESS NOTE ADULT - SUBJECTIVE AND OBJECTIVE BOX
PGY1 Note discussed with supervising resident and primary attending.    Patient is a 74y old  Female who presents with a chief complaint of     INTERVAL HPI/OVERNIGHT EVENTS: Patient seen and examined at bedside.     MEDICATIONS  (STANDING):  atorvastatin 10 milliGRAM(s) Oral at bedtime  ciprofloxacin   IVPB 400 milliGRAM(s) IV Intermittent every 12 hours  ciprofloxacin   IVPB      heparin  Injectable 5000 Unit(s) SubCutaneous every 12 hours  metroNIDAZOLE  IVPB      metroNIDAZOLE  IVPB 500 milliGRAM(s) IV Intermittent every 8 hours  sodium chloride 0.9%. 1000 milliLiter(s) (75 mL/Hr) IV Continuous <Continuous>    MEDICATIONS  (PRN):  acetaminophen   Tablet .. 650 milliGRAM(s) Oral every 6 hours PRN Temp greater or equal to 38C (100.4F)      Allergies    No Known Allergies    Intolerances        REVIEW OF SYSTEMS:  CONSTITUTIONAL: No fever, weight loss, or fatigue  RESPIRATORY: No cough, wheezing, chills or hemoptysis; No shortness of breath  CARDIOVASCULAR: No chest pain, palpitations, dizziness, or leg swelling  GASTROINTESTINAL: No abdominal or epigastric pain. No nausea, vomiting, or hematemesis; No diarrhea or constipation. No melena or hematochezia.  NEUROLOGICAL: No headaches, memory loss, loss of strength, numbness, or tremors  SKIN: No itching, burning, rashes, or lesions     Vital Signs Last 24 Hrs  T(C): 38.2 (31 Aug 2019 05:13), Max: 38.9 (30 Aug 2019 16:17)  T(F): 100.7 (31 Aug 2019 05:13), Max: 102.1 (30 Aug 2019 16:17)  HR: 103 (31 Aug 2019 05:13) (94 - 123)  BP: 108/67 (31 Aug 2019 01:00) (108/67 - 129/74)  BP(mean): --  RR: 18 (31 Aug 2019 05:13) (18 - 20)  SpO2: 97% (31 Aug 2019 05:13) (97% - 100%)    PHYSICAL EXAM:  GENERAL: NAD, well-groomed, well-developed  HEAD:  Atraumatic, Normocephalic  EYES: EOMI, PERRLA, conjunctiva and sclera clear  NECK: Supple, No JVD, Normal thyroid  CHEST/LUNG: Clear to percussion bilaterally; No rales, rhonchi, wheezing, or rubs  HEART: Regular rate and rhythm; No murmurs, rubs, or gallops  ABDOMEN: Soft, Nontender, Nondistended; Bowel sounds present  NERVOUS SYSTEM:  Alert & Oriented X3, Good concentration; Motor Strength 5/5 B/L   EXTREMITIES:  2+ Peripheral Pulses, No clubbing, cyanosis, or edema  SKIN;    LABS:                        9.1    5.96  )-----------( 154      ( 31 Aug 2019 06:53 )             29.3         136  |  103  |  11  ----------------------------<  90  3.9   |  29  |  0.68    Ca    8.4      31 Aug 2019 06:53  Phos  2.5         TPro  6.8  /  Alb  2.9<L>  /  TBili  0.5  /  DBili  x   /  AST  18  /  ALT  18  /  AlkPhos  80      PT/INR - ( 30 Aug 2019 12:50 )   PT: 13.7 sec;   INR: 1.23 ratio         PTT - ( 30 Aug 2019 12:50 )  PTT:30.3 sec  Urinalysis Basic - ( 30 Aug 2019 18:34 )    Color: Yellow / Appearance: Clear / S.010 / pH: x  Gluc: x / Ketone: Trace  / Bili: Negative / Urobili: Negative   Blood: x / Protein: 30 mg/dL / Nitrite: Negative   Leuk Esterase: Trace / RBC: 0-2 /HPF / WBC 0-2 /HPF   Sq Epi: x / Non Sq Epi: Few /HPF / Bacteria: Trace /HPF      CAPILLARY BLOOD GLUCOSE          RADIOLOGY & ADDITIONAL TESTS:    Imaging Personally Reviewed:  [x ] YES  [ ] NO    Consultant(s) Notes Reviewed:  [ x] YES  [ ] NO PGY1 Note discussed with supervising resident and primary attending.    Patient is a 74y old  Female who presents with a chief complaint of     INTERVAL HPI/OVERNIGHT EVENTS: Patient seen and examined at bedside. Pt complaining of mild pain on both sides on her back. No chills or diarrhea.    MEDICATIONS  (STANDING):  atorvastatin 10 milliGRAM(s) Oral at bedtime  ciprofloxacin   IVPB 400 milliGRAM(s) IV Intermittent every 12 hours  ciprofloxacin   IVPB      heparin  Injectable 5000 Unit(s) SubCutaneous every 12 hours  metroNIDAZOLE  IVPB      metroNIDAZOLE  IVPB 500 milliGRAM(s) IV Intermittent every 8 hours  sodium chloride 0.9%. 1000 milliLiter(s) (75 mL/Hr) IV Continuous <Continuous>    MEDICATIONS  (PRN):  acetaminophen   Tablet .. 650 milliGRAM(s) Oral every 6 hours PRN Temp greater or equal to 38C (100.4F)      Allergies    No Known Allergies    Intolerances        REVIEW OF SYSTEMS:  CONSTITUTIONAL: No fever, weight loss, or fatigue  RESPIRATORY: No cough, wheezing, chills or hemoptysis; No shortness of breath  CARDIOVASCULAR: No chest pain, palpitations, dizziness, or leg swelling  GASTROINTESTINAL: No abdominal or epigastric pain. No nausea, vomiting, or hematemesis; No diarrhea or constipation. No melena or hematochezia.  NEUROLOGICAL: No headaches, memory loss, loss of strength, numbness, or tremors  SKIN: No itching, burning, rashes, or lesions     Vital Signs Last 24 Hrs  T(C): 38.2 (31 Aug 2019 05:13), Max: 38.9 (30 Aug 2019 16:17)  T(F): 100.7 (31 Aug 2019 05:13), Max: 102.1 (30 Aug 2019 16:17)  HR: 103 (31 Aug 2019 05:13) (94 - 123)  BP: 108/67 (31 Aug 2019 01:00) (108/67 - 129/74)  BP(mean): --  RR: 18 (31 Aug 2019 05:13) (18 - 20)  SpO2: 97% (31 Aug 2019 05:13) (97% - 100%)    PHYSICAL EXAM:  GENERAL: NAD, well-groomed, well-developed  HEAD:  Atraumatic, Normocephalic  EYES: EOMI, PERRLA, conjunctiva and sclera clear  NECK: Supple, No JVD, Normal thyroid  CHEST/LUNG: Clear to percussion bilaterally; No rales, rhonchi, wheezing, or rubs  HEART: Regular rate and rhythm; No murmurs, rubs, or gallops  ABDOMEN: Soft, Nontender, Nondistended; Bowel sounds present  NERVOUS SYSTEM:  Alert & Oriented X3, Good concentration; Motor Strength 5/5 B/L   EXTREMITIES:  2+ Peripheral Pulses, No clubbing, cyanosis, or edema  SKIN;    LABS:                        9.1    5.96  )-----------( 154      ( 31 Aug 2019 06:53 )             29.3         136  |  103  |  11  ----------------------------<  90  3.9   |  29  |  0.68    Ca    8.4      31 Aug 2019 06:53  Phos  2.5         TPro  6.8  /  Alb  2.9<L>  /  TBili  0.5  /  DBili  x   /  AST  18  /  ALT  18  /  AlkPhos  80      PT/INR - ( 30 Aug 2019 12:50 )   PT: 13.7 sec;   INR: 1.23 ratio         PTT - ( 30 Aug 2019 12:50 )  PTT:30.3 sec  Urinalysis Basic - ( 30 Aug 2019 18:34 )    Color: Yellow / Appearance: Clear / S.010 / pH: x  Gluc: x / Ketone: Trace  / Bili: Negative / Urobili: Negative   Blood: x / Protein: 30 mg/dL / Nitrite: Negative   Leuk Esterase: Trace / RBC: 0-2 /HPF / WBC 0-2 /HPF   Sq Epi: x / Non Sq Epi: Few /HPF / Bacteria: Trace /HPF      CAPILLARY BLOOD GLUCOSE          RADIOLOGY & ADDITIONAL TESTS:    Imaging Personally Reviewed:  [x ] YES  [ ] NO    Consultant(s) Notes Reviewed:  [ x] YES  [ ] NO

## 2019-08-31 NOTE — CONSULT NOTE ADULT - NEGATIVE ENMT SYMPTOMS
no gum bleeding/no ear pain/no throat pain/no dysphagia/no nose bleeds/no dry mouth/no hearing difficulty

## 2019-08-31 NOTE — CONSULT NOTE ADULT - SUBJECTIVE AND OBJECTIVE BOX
[  ] STAT REQUEST              [ X ] ROUTINE REQUEST    Patient is a 74 year old female with anemia and gastrointestinal bleeding. GI consulted to evaluate.         HPI:  Patient is 74 year old female presented with 4 days h/o watery non bloody diarrhea associated with generalized weakness.  Patient denies abdominal pain, nausea, vomiting, hematemesis, hematochezia, melena, fever, chills, chest pain, SOB, cough, palpitation, cough, hematouria, dysuria, recent traveling or antibiotic use.       PAIN MANAGEMENT:  Pain Scale:                 0/10  Pain Location:      Prior Colonoscopy:   More than 5 years ago    PAST MEDICAL HISTORY  GERD    Hyperlipidemia      PAST SURGICAL HISTORY  No significant past surgical history reported         Allergies    No Known Allergies           MEDICATIONS  (STANDING):  atorvastatin 10 milliGRAM(s) Oral at bedtime  ciprofloxacin   IVPB 400 milliGRAM(s) IV Intermittent every 12 hours  ciprofloxacin   IVPB      heparin  Injectable 5000 Unit(s) SubCutaneous every 12 hours  metroNIDAZOLE  IVPB      metroNIDAZOLE  IVPB 500 milliGRAM(s) IV Intermittent every 8 hours  sodium chloride 0.9%. 1000 milliLiter(s) (75 mL/Hr) IV Continuous <Continuous>    MEDICATIONS  (PRN):  acetaminophen   Tablet .. 650 milliGRAM(s) Oral every 6 hours PRN Temp greater or equal to 38C (100.4F)      SOCIAL HISTORY  Advanced Directives:       [ X ] Full Code       [  ] DNR  Marital Status:         [  ] M      [ X ] S      [  ] D       [  ] W  Children:       [ X ] Yes      [  ] No  Occupation:        [  ] Employed       [ X ] Unemployed       [  ] Retired  Diet:       [ X ] Regular       [  ] PEG feeding          [  ] NG tube feeding  Drug Use:           [ X ] No               [  ] Yes  Alcohol:           [X  ] No             [  ] Yes (socially)         [  ] Yes (chronic)  Tobacco:           [  ] Yes           [X  ] No        FAMILY HISTORY  [ X ] Heart Disease            [  ] Diabetes             [  ] HTN             [  ] Colon Cancer             [  ] Stomach Cancer              [  ] Pancreatic Cancer        VITAL SIGNS   Vital Signs Last 24 Hrs  T(C): 36.8 (31 Aug 2019 13:38), Max: 38.2 (31 Aug 2019 05:13)  T(F): 98.3 (31 Aug 2019 13:38), Max: 100.7 (31 Aug 2019 05:13)  HR: 96 (31 Aug 2019 13:38) (94 - 103)  BP: 116/68 (31 Aug 2019 13:38) (108/67 - 120/52)   RR: 18 (31 Aug 2019 13:38) (18 - 20)  SpO2: 98% (31 Aug 2019 13:38) (97% - 100%)          CBC Full  -  ( 31 Aug 2019 06:53 )  WBC Count : 5.96 K/uL  RBC Count : 4.58 M/uL  Hemoglobin : 9.1 g/dL  Hematocrit : 29.3 %  Platelet Count - Automated : 154 K/uL  Mean Cell Volume : 64.0 fl  Mean Cell Hemoglobin : 19.9 pg  Mean Cell Hemoglobin Concentration : 31.1 gm/dL  Auto Neutrophil # : 4.41 K/uL  Auto Lymphocyte # : 0.99 K/uL  Auto Monocyte # : 0.50 K/uL  Auto Eosinophil # : 0.02 K/uL  Auto Basophil # : 0.02 K/uL  Auto Neutrophil % : 74.1 %  Auto Lymphocyte % : 16.6 %  Auto Monocyte % : 8.4 %  Auto Eosinophil % : 0.3 %  Auto Basophil % : 0.3 %      08-31    136  |  103  |  11  ----------------------------<  90  3.9   |  29  |  0.68    Ca    8.4      31 Aug 2019 06:53  Phos  2.5     08-31    TPro  6.8  /  Alb  2.9<L>  /  TBili  0.5  /  DBili  x   /  AST  18  /  ALT  18  /  AlkPhos  80  08-31      Lipase, Serum: 83 U/L (08-30 @ 18:39)     PT/INR - ( 30 Aug 2019 12:50 )   PT: 13.7 sec;   INR: 1.23 ratio     PTT - ( 30 Aug 2019 12:50 )  PTT:30.3 sec      Occult Blood, Feces (08.31.19 @ 05:34)    Occult Blood, Feces: Positive    Iron with Total Binding Capacity (08.31.19 @ 06:53)    Iron - Total Binding Capacity.: 165 ug/dL    % Saturation, Iron: 6 %    Iron Total, Serum: 10 ug/dL    Unsaturated Iron Binding Capacity: 155 ug/dL    Urinalysis (08.30.19 @ 18:34)    pH Urine: 5.0    Glucose Qualitative, Urine: Negative    Blood, Urine: Moderate    Color: Yellow    Urine Appearance: Clear    Bilirubin: Negative    Ketone - Urine: Trace    Specific Gravity: 1.010    Protein, Urine: 30 mg/dL    Urobilinogen: Negative    Nitrite: Negative    Leukocyte Esterase Concentration: Trace      ECG  Ventricular Rate 115 BPM    Atrial Rate 115 BPM    P-R Interval 138 ms    QRS Duration 70 ms    Q-T Interval 322 ms    QTC Calculation(Bezet) 445 ms    P Axis 41 degrees    R Axis 3 degrees    T Axis -9 degrees    Diagnosis Line Sinus tachycardia  Possible Left atrial enlargement  Nonspecific ST and T wave abnormality  Abnormal ECG      RADIOLOGY/IMAGING                EXAM:  CT ABDOMEN AND PELVIS IC                            PROCEDURE DATE:  05/05/2018          INTERPRETATION:  CT ABDOMEN AND PELVIS DATED 5/5/2018.    CLINICAL INFORMATION:  Diffuse abdominal pain (left greater than right)..    TECHNIQUE:  AxialCT images through the abdomen and pelvis are acquired   with administration of intravenous contrast. Images are reformatted in   the sagittal and coronal planes. 90cc of Omnipaque 350 were administered   without event.  10cc were discarded.    The study is correlated with report from a prior examination from   5/4/2018.    FINDINGS:    There is mild bibasilar dependent and platelike subsegmental atelectasis.    There is mild diffuse hepatic steatosis. The gallbladder is normally   distended. There are no radiodense gallstones. There is no biliary ductal   dilatation. The pancreas, spleen, adrenal glands, and kidneys are   unremarkable apart from a few tiny subcentimeter low-attenuation lesions   in both kidneys which are too small to characterize. The urinary bladder   is unremarkable in appearance. The uterus and adnexa are unremarkable.   There is a small amount of pelvic free fluid.    There is a small hiatus hernia. There is no bowel obstruction, overt   bowel wall thickening, or mesenteric inflammatory change. The appendix is   not discretely identified, however, there are no secondary CT findings to   suggest appendicitis. There is no pneumoperitoneum or loculated   collection to suggest abscess.    There is no significant abdominal or pelvic lymphadenopathy. There is   atherosclerotic calcification of the aortoiliac tree. The abdominal aorta   is normal in caliber.    There is a small fat-containing right inguinal hernia. There is a tiny   fat-containing umbilical hernia.    The bones are diffusely demineralized. There are mild multilevel   degenerative changes of the spine.    IMPRESSION:    No acute findings on CT of the abdomen and pelvis to explain patient's   abdominal pain..

## 2019-09-01 LAB
ANION GAP SERPL CALC-SCNC: 7 MMOL/L — SIGNIFICANT CHANGE UP (ref 5–17)
BUN SERPL-MCNC: 8 MG/DL — SIGNIFICANT CHANGE UP (ref 7–18)
CALCIUM SERPL-MCNC: 8.7 MG/DL — SIGNIFICANT CHANGE UP (ref 8.4–10.5)
CHLORIDE SERPL-SCNC: 109 MMOL/L — HIGH (ref 96–108)
CO2 SERPL-SCNC: 27 MMOL/L — SIGNIFICANT CHANGE UP (ref 22–31)
CREAT SERPL-MCNC: 0.65 MG/DL — SIGNIFICANT CHANGE UP (ref 0.5–1.3)
CULTURE RESULTS: SIGNIFICANT CHANGE UP
CULTURE RESULTS: SIGNIFICANT CHANGE UP
GLUCOSE SERPL-MCNC: 95 MG/DL — SIGNIFICANT CHANGE UP (ref 70–99)
HCT VFR BLD CALC: 29.3 % — LOW (ref 34.5–45)
HGB BLD-MCNC: 8.9 G/DL — LOW (ref 11.5–15.5)
MCHC RBC-ENTMCNC: 19.9 PG — LOW (ref 27–34)
MCHC RBC-ENTMCNC: 30.4 GM/DL — LOW (ref 32–36)
MCV RBC AUTO: 65.5 FL — LOW (ref 80–100)
NRBC # BLD: 0 /100 WBCS — SIGNIFICANT CHANGE UP (ref 0–0)
PLATELET # BLD AUTO: 153 K/UL — SIGNIFICANT CHANGE UP (ref 150–400)
POTASSIUM SERPL-MCNC: 4 MMOL/L — SIGNIFICANT CHANGE UP (ref 3.5–5.3)
POTASSIUM SERPL-SCNC: 4 MMOL/L — SIGNIFICANT CHANGE UP (ref 3.5–5.3)
RBC # BLD: 4.47 M/UL — SIGNIFICANT CHANGE UP (ref 3.8–5.2)
RBC # FLD: 15.6 % — HIGH (ref 10.3–14.5)
SODIUM SERPL-SCNC: 143 MMOL/L — SIGNIFICANT CHANGE UP (ref 135–145)
SPECIMEN SOURCE: SIGNIFICANT CHANGE UP
SPECIMEN SOURCE: SIGNIFICANT CHANGE UP
WBC # BLD: 5.31 K/UL — SIGNIFICANT CHANGE UP (ref 3.8–10.5)
WBC # FLD AUTO: 5.31 K/UL — SIGNIFICANT CHANGE UP (ref 3.8–10.5)

## 2019-09-01 RX ADMIN — Medication 100 MILLIGRAM(S): at 22:03

## 2019-09-01 RX ADMIN — Medication 200 MILLIGRAM(S): at 06:07

## 2019-09-01 RX ADMIN — Medication 200 MILLIGRAM(S): at 17:31

## 2019-09-01 RX ADMIN — ATORVASTATIN CALCIUM 10 MILLIGRAM(S): 80 TABLET, FILM COATED ORAL at 22:03

## 2019-09-01 RX ADMIN — Medication 100 MILLIGRAM(S): at 14:25

## 2019-09-01 RX ADMIN — Medication 100 MILLIGRAM(S): at 06:07

## 2019-09-01 NOTE — PROGRESS NOTE ADULT - SUBJECTIVE AND OBJECTIVE BOX
Patient is a 74y old  Female who presents with a chief complaint of     INTERVAL HPI/OVERNIGHT EVENTS: Patient seen and examined at bedside. Pt complaining of mild pain on both sides on her back. No chills or diarrhea.    MEDICATIONS  (STANDING):  atorvastatin 10 milliGRAM(s) Oral at bedtime  ciprofloxacin   IVPB 400 milliGRAM(s) IV Intermittent every 12 hours  ciprofloxacin   IVPB      heparin  Injectable 5000 Unit(s) SubCutaneous every 12 hours  metroNIDAZOLE  IVPB      metroNIDAZOLE  IVPB 500 milliGRAM(s) IV Intermittent every 8 hours  sodium chloride 0.9%. 1000 milliLiter(s) (75 mL/Hr) IV Continuous <Continuous>    MEDICATIONS  (PRN):  acetaminophen   Tablet .. 650 milliGRAM(s) Oral every 6 hours PRN Temp greater or equal to 38C (100.4F)  No Known Allergies    Intolerances        REVIEW OF SYSTEMS:  CONSTITUTIONAL: No fever, weight loss, or fatigue  RESPIRATORY: No cough, wheezing, chills or hemoptysis; No shortness of breath  CARDIOVASCULAR: No chest pain, palpitations, dizziness, or leg swelling  GASTROINTESTINAL: No abdominal or epigastric pain. No nausea, vomiting, or hematemesis; No diarrhea or constipation. No melena or hematochezia.  NEUROLOGICAL: No headaches, memory loss, loss of strength, numbness, or tremors  SKIN: No itching, burning, rashes, or lesions     Vital Signs Last 24 Hrs  T(C): 37.1 (01 Sep 2019 20:38), Max: 37.1 (01 Sep 2019 17:18)  T(F): 98.7 (01 Sep 2019 20:38), Max: 98.8 (01 Sep 2019 17:18)  HR: 95 (01 Sep 2019 17:18) (80 - 95)  BP: 131/72 (01 Sep 2019 17:18) (97/61 - 131/72)  BP(mean): --  RR: 17 (01 Sep 2019 20:38) (16 - 18)  SpO2: 95% (01 Sep 2019 20:38) (95% - 98%)    PHYSICAL EXAM:  GENERAL: NAD, well-groomed, well-developed  HEAD:  Atraumatic, Normocephalic  EYES: EOMI, PERRLA, conjunctiva and sclera clear  NECK: Supple, No JVD, Normal thyroid  CHEST/LUNG: Clear to percussion bilaterally; No rales, rhonchi, wheezing, or rubs  HEART: Regular rate and rhythm; No murmurs, rubs, or gallops  ABDOMEN: Soft, Nontender, Nondistended; Bowel sounds present  NERVOUS SYSTEM:  Alert & Oriented X3, Good concentration; Motor Strength 5/5 B/L   EXTREMITIES:  2+ Peripheral Pulses, No clubbing, cyanosis, or edema  SKIN;    LABS:      143  |  109<H>  |  8   ----------------------------<  95  4.0   |  27  |  0.65    Ca    8.7      01 Sep 2019 06:54  Phos  2.5         TPro  6.8  /  Alb  2.9<L>  /  TBili  0.5  /  DBili      /  AST  18  /  ALT  18  /  AlkPhos  80      Creatinine Trend: 0.65 <--, 0.68 <--, 0.90 <--                        8.9    5.31  )-----------( 153      ( 01 Sep 2019 06:54 )             29.3     Urine Studies:  Urinalysis Basic - ( 30 Aug 2019 18:34 )    Color: Yellow / Appearance: Clear / S.010 / pH:   Gluc:  / Ketone: Trace  / Bili: Negative / Urobili: Negative   Blood:  / Protein: 30 mg/dL / Nitrite: Negative   Leuk Esterase: Trace / RBC: 0-2 /HPF / WBC 0-2 /HPF   Sq Epi:  / Non Sq Epi: Few /HPF / Bacteria: Trace /HPF              LIVER FUNCTIONS - ( 31 Aug 2019 06:53 )  Alb: 2.9 g/dL / Pro: 6.8 g/dL / ALK PHOS: 80 U/L / ALT: 18 U/L DA / AST: 18 U/L / GGT: x             PTT - ( 30 Aug 2019 12:50 )  PTT:30.3 sec  Urinalysis Basic - ( 30 Aug 2019 18:34 )    Color: Yellow / Appearance: Clear / S.010 / pH: x  Gluc: x / Ketone: Trace  / Bili: Negative / Urobili: Negative   Blood: x / Protein: 30 mg/dL / Nitrite: Negative   Leuk Esterase: Trace / RBC: 0-2 /HPF / WBC 0-2 /HPF   Sq Epi: x / Non Sq Epi: Few /HPF / Bacteria: Trace /HPF      CAPILLARY BLOOD GLUCOSE          RADIOLOGY & ADDITIONAL TESTS:    Imaging Personally Reviewed:  [x ] YES  [ ] NO    Consultant(s) Notes Reviewed:  [ x] YES  [ ] NO

## 2019-09-01 NOTE — PROGRESS NOTE ADULT - SUBJECTIVE AND OBJECTIVE BOX
[   ] ICU                                          [   ] CCU                                      [ X  ] Medical Floor      Patient is comfortable. No new complaints reported, No abdominal pain, N/V, hematemesis, hematochezia, melena, fever, chills, chest pain, SOB, cough or diarrhea reported.    VITALS  Vital Signs Last 24 Hrs  T(C): 36.5 (01 Sep 2019 09:48), Max: 36.9 (31 Aug 2019 17:28)  T(F): 97.7 (01 Sep 2019 09:48), Max: 98.5 (31 Aug 2019 17:28)  HR: 93 (01 Sep 2019 09:48) (80 - 96)  BP: 114/67 (01 Sep 2019 09:48) (97/61 - 121/55)  BP(mean): --  RR: 16 (01 Sep 2019 09:48) (16 - 18)  SpO2: 98% (01 Sep 2019 09:48) (95% - 98%)         MEDICATIONS  (STANDING):  atorvastatin 10 milliGRAM(s) Oral at bedtime  ciprofloxacin   IVPB 400 milliGRAM(s) IV Intermittent every 12 hours  ciprofloxacin   IVPB      heparin  Injectable 5000 Unit(s) SubCutaneous every 12 hours  metroNIDAZOLE  IVPB      metroNIDAZOLE  IVPB 500 milliGRAM(s) IV Intermittent every 8 hours  sodium chloride 0.9%. 1000 milliLiter(s) (75 mL/Hr) IV Continuous <Continuous>    MEDICATIONS  (PRN):  acetaminophen   Tablet .. 650 milliGRAM(s) Oral every 6 hours PRN Temp greater or equal to 38C (100.4F)                            8.9    5.31  )-----------( 153      ( 01 Sep 2019 06:54 )             29.3       09-01    143  |  109<H>  |  8   ----------------------------<  95  4.0   |  27  |  0.65    Ca    8.7      01 Sep 2019 06:54  Phos  2.5     08-31    TPro  6.8  /  Alb  2.9<L>  /  TBili  0.5  /  DBili  x   /  AST  18  /  ALT  18  /  AlkPhos  80  08-31

## 2019-09-02 LAB
ANION GAP SERPL CALC-SCNC: 6 MMOL/L — SIGNIFICANT CHANGE UP (ref 5–17)
BUN SERPL-MCNC: 6 MG/DL — LOW (ref 7–18)
CALCIUM SERPL-MCNC: 9.1 MG/DL — SIGNIFICANT CHANGE UP (ref 8.4–10.5)
CHLORIDE SERPL-SCNC: 110 MMOL/L — HIGH (ref 96–108)
CO2 SERPL-SCNC: 26 MMOL/L — SIGNIFICANT CHANGE UP (ref 22–31)
CREAT SERPL-MCNC: 0.66 MG/DL — SIGNIFICANT CHANGE UP (ref 0.5–1.3)
GLUCOSE SERPL-MCNC: 89 MG/DL — SIGNIFICANT CHANGE UP (ref 70–99)
HCT VFR BLD CALC: 29.2 % — LOW (ref 34.5–45)
HGB BLD-MCNC: 9 G/DL — LOW (ref 11.5–15.5)
MCHC RBC-ENTMCNC: 19.7 PG — LOW (ref 27–34)
MCHC RBC-ENTMCNC: 30.8 GM/DL — LOW (ref 32–36)
MCV RBC AUTO: 63.9 FL — LOW (ref 80–100)
NRBC # BLD: 0 /100 WBCS — SIGNIFICANT CHANGE UP (ref 0–0)
PLATELET # BLD AUTO: 189 K/UL — SIGNIFICANT CHANGE UP (ref 150–400)
POTASSIUM SERPL-MCNC: 3.8 MMOL/L — SIGNIFICANT CHANGE UP (ref 3.5–5.3)
POTASSIUM SERPL-SCNC: 3.8 MMOL/L — SIGNIFICANT CHANGE UP (ref 3.5–5.3)
RBC # BLD: 4.57 M/UL — SIGNIFICANT CHANGE UP (ref 3.8–5.2)
RBC # FLD: 15.8 % — HIGH (ref 10.3–14.5)
SODIUM SERPL-SCNC: 142 MMOL/L — SIGNIFICANT CHANGE UP (ref 135–145)
WBC # BLD: 4.75 K/UL — SIGNIFICANT CHANGE UP (ref 3.8–10.5)
WBC # FLD AUTO: 4.75 K/UL — SIGNIFICANT CHANGE UP (ref 3.8–10.5)

## 2019-09-02 RX ORDER — AZITHROMYCIN 500 MG/1
500 TABLET, FILM COATED ORAL DAILY
Refills: 0 | Status: DISCONTINUED | OUTPATIENT
Start: 2019-09-02 | End: 2019-09-03

## 2019-09-02 RX ORDER — SOD SULF/SODIUM/NAHCO3/KCL/PEG
4000 SOLUTION, RECONSTITUTED, ORAL ORAL ONCE
Refills: 0 | Status: COMPLETED | OUTPATIENT
Start: 2019-09-02 | End: 2019-09-02

## 2019-09-02 RX ADMIN — Medication 200 MILLIGRAM(S): at 18:06

## 2019-09-02 RX ADMIN — Medication 4000 MILLILITER(S): at 13:18

## 2019-09-02 RX ADMIN — Medication 200 MILLIGRAM(S): at 06:28

## 2019-09-02 RX ADMIN — SODIUM CHLORIDE 75 MILLILITER(S): 9 INJECTION INTRAMUSCULAR; INTRAVENOUS; SUBCUTANEOUS at 06:28

## 2019-09-02 RX ADMIN — ATORVASTATIN CALCIUM 10 MILLIGRAM(S): 80 TABLET, FILM COATED ORAL at 22:27

## 2019-09-02 RX ADMIN — Medication 100 MILLIGRAM(S): at 06:28

## 2019-09-02 RX ADMIN — Medication 100 MILLIGRAM(S): at 13:18

## 2019-09-02 RX ADMIN — AZITHROMYCIN 500 MILLIGRAM(S): 500 TABLET, FILM COATED ORAL at 18:06

## 2019-09-02 NOTE — CONSULT NOTE ADULT - GASTROINTESTINAL DETAILS
bowel sounds normal/no organomegaly/no rigidity/no rebound tenderness/no guarding/no distention/no masses palpable/soft

## 2019-09-02 NOTE — PROGRESS NOTE ADULT - PROBLEM SELECTOR PLAN 1
Fever, tachycardia, diarrhea, no leucocytosis, normal lactate   likely from gastroenteritis  pt improved from ivf  u/a neg, cxr neg  will send stool studies   will start on cipro and flagyl since pt has fever and diarrhea has not been resolved
Fever, tachycardia, diarrhea, no leucocytosis, normal lactate   likely from gastroenteritis  pt improved from ivf  u/a neg, cxr neg  urine Cx neg, blood Cx neg   stool studies shows EPEC and campylobacter species   on cipro and flagyl since pt has fever and diarrhea has not been resolved
Fever, tachycardia, diarrhea, no leucocytosis, normal lactate   likely from gastroenteritis  pt improved from ivf  u/a neg, cxr neg  will send stool studies   will start on cipro and flagyl since pt has fever and diarrhea has not been resolved

## 2019-09-02 NOTE — CONSULT NOTE ADULT - NEUROLOGICAL DETAILS
responds to pain/responds to verbal commands/sensation intact
alert and oriented x 3/normal strength

## 2019-09-02 NOTE — CONSULT NOTE ADULT - ASSESSMENT
A/P:    - No IV tpa given because not a stroke    She does not need any further neurological evaluation in the hospital. If concerned about gait she may wish to be evaluated in the out-patient office     Total Critical Care Time spent: 45 min
Gastroenteritis with Enterotoxigenic E. coli and Campylobacter  Fever - resolved    Plan - cont cipro 400mgs iv q12hrs but can switch to 500mgs po bid from tomorrow for 2 days more  DC Flagyl  Start Azithromycin 500mgs po q24hrs x 3 days  DC planning for tomorrow.
1. Diarrhea  2. Positive occult blood instool  3. Anemia  4. No evidence of acute GI bleeding    Suggestions:    1. Check Stool for culture, O&P and C. Diff toxing  2. Monitor electrolytes  3. Monitor H/H  4. Transfuse PRBC as needed  5. Antibiotics as per ID  6. Protonix daily  7. Colonoscopy  8. DVT prophylaxis

## 2019-09-02 NOTE — CONSULT NOTE ADULT - RS GEN PE MLT RESP DETAILS PC
respirations non-labored/airway patent/breath sounds equal/good air movement
good air movement/clear to auscultation bilaterally/no rales/no rhonchi/no wheezes

## 2019-09-02 NOTE — CONSULT NOTE ADULT - NEGATIVE NEUROLOGICAL SYMPTOMS
no confusion/no headache/no transient paralysis/no paresthesias
no syncope/no tremors/no vertigo/no headache/no loss of sensation

## 2019-09-02 NOTE — PROGRESS NOTE ADULT - SUBJECTIVE AND OBJECTIVE BOX
PGY1 Note discussed with supervising resident and primary attending.    Patient is a 74y old  Female who presents with a chief complaint of diarrhea (31 Aug 2019 16:16)      INTERVAL HPI/OVERNIGHT EVENTS: Patient seen and examined at bedside. Pt had 4 semisolid bowel movements yesterday.    MEDICATIONS  (STANDING):  atorvastatin 10 milliGRAM(s) Oral at bedtime  bisacodyl 20 milliGRAM(s) Oral once  ciprofloxacin   IVPB 400 milliGRAM(s) IV Intermittent every 12 hours  ciprofloxacin   IVPB      heparin  Injectable 5000 Unit(s) SubCutaneous every 12 hours  metroNIDAZOLE  IVPB      metroNIDAZOLE  IVPB 500 milliGRAM(s) IV Intermittent every 8 hours  polyethylene glycol/electrolyte Solution. 4000 milliLiter(s) Oral once    MEDICATIONS  (PRN):  acetaminophen   Tablet .. 650 milliGRAM(s) Oral every 6 hours PRN Temp greater or equal to 38C (100.4F)      Allergies    No Known Allergies    Intolerances        REVIEW OF SYSTEMS:  CONSTITUTIONAL: No fever, weight loss, or fatigue  RESPIRATORY: No cough, wheezing, chills or hemoptysis; No shortness of breath  CARDIOVASCULAR: No chest pain, palpitations, dizziness, or leg swelling  GASTROINTESTINAL: No abdominal or epigastric pain. No nausea, vomiting, or hematemesis; No diarrhea or constipation. No melena or hematochezia.  NEUROLOGICAL: No headaches, memory loss, loss of strength, numbness, or tremors  SKIN: No itching, burning, rashes, or lesions     Vital Signs Last 24 Hrs  T(C): 36.8 (02 Sep 2019 10:48), Max: 37.1 (01 Sep 2019 17:18)  T(F): 98.2 (02 Sep 2019 10:48), Max: 98.8 (01 Sep 2019 17:18)  HR: 95 (02 Sep 2019 10:48) (74 - 95)  BP: 133/79 (02 Sep 2019 10:48) (117/76 - 133/79)  BP(mean): --  RR: 16 (02 Sep 2019 10:48) (16 - 17)  SpO2: 98% (02 Sep 2019 10:48) (94% - 100%)    PHYSICAL EXAM:  GENERAL: NAD, well-groomed, well-developed  HEAD:  Atraumatic, Normocephalic  EYES: EOMI, PERRLA, conjunctiva and sclera clear  NECK: Supple, No JVD, Normal thyroid  CHEST/LUNG: Clear to percussion bilaterally; No rales, rhonchi, wheezing, or rubs  HEART: Regular rate and rhythm; No murmurs, rubs, or gallops  ABDOMEN: Soft, Nontender, Nondistended; Bowel sounds present  NERVOUS SYSTEM:  Alert & Oriented X3, Good concentration; Motor Strength 5/5 B/L   EXTREMITIES:  2+ Peripheral Pulses, No clubbing, cyanosis, or edema  SKIN;    LABS:                        9.0    4.75  )-----------( 189      ( 02 Sep 2019 06:35 )             29.2     09-02    142  |  110<H>  |  6<L>  ----------------------------<  89  3.8   |  26  |  0.66    Ca    9.1      02 Sep 2019 06:35          CAPILLARY BLOOD GLUCOSE          RADIOLOGY & ADDITIONAL TESTS:    Imaging Personally Reviewed:  [ ] YES  [ ] NO    Consultant(s) Notes Reviewed:  [ ] YES  [ ] NO

## 2019-09-02 NOTE — CONSULT NOTE ADULT - SUBJECTIVE AND OBJECTIVE BOX
HPI:  Patient is 74 year old female has past medical history of migraines, hld, chronic urinary hesitancy was sent from pcp because of fever associated with generalized weakness and difficulty walking.   Per pt she was in her usual health a week ago, when she started to have diarrhea, non bloody, watery, pt has have 4 episode of water diarrhea since she came to the hospital, pt started to feel weak since last night but today morning she woke up pt felt very weak and she could not walk properly she was swaying, but never felt she was about to fall. pt went to pcp today morning where she had rectal temp of 102 and ekg was abnormal. pt was sent to ED.  Pt denies dizziness, unusual headache vision problem, ear problem, cough, cp, sob, palpitation, carmpy abdominal pain, no recent travel no recent antibiotic use, no sore throat, no unusual eating, dysuria, hematuria muscle pain or joint pain. Pt states last week she notice mosquito bite on her leg, hand and arm. pt states she was taking flexeril (unknown dose) at bedtime for last 2 weeks and she was not prescribed flexeril she was taking it form relative.    ED course pt was code sepsis in the ED, temperature and tachycardia, lactate was normal, blood and urine was sent, u/a negative, pt got IV Fluid and zosyn and vancomycin, after getting IVF in the ED, pt states she feels better and she does not have weakness anymore. (30 Aug 2019 22:01)      PAST MEDICAL & SURGICAL HISTORY:  GERD (gastroesophageal reflux disease)  HLD (hyperlipidemia)  No significant past surgical history      No Known Allergies      Meds:  acetaminophen   Tablet .. 650 milliGRAM(s) Oral every 6 hours PRN  atorvastatin 10 milliGRAM(s) Oral at bedtime  ciprofloxacin   IVPB 400 milliGRAM(s) IV Intermittent every 12 hours  ciprofloxacin   IVPB      heparin  Injectable 5000 Unit(s) SubCutaneous every 12 hours  metroNIDAZOLE  IVPB      metroNIDAZOLE  IVPB 500 milliGRAM(s) IV Intermittent every 8 hours      SOCIAL HISTORY:  Smoker:  YES / NO        PACK YEARS:                         WHEN QUIT?  ETOH use:  YES / NO               FREQUENCY / QUANTITY:  Ilicit Drug use:  YES / NO  Occupation:  Assisted device use (Cane / Walker):  Live with:    FAMILY HISTORY:      VITALS:  Vital Signs Last 24 Hrs  T(C): 36.5 (02 Sep 2019 13:48), Max: 37.1 (01 Sep 2019 17:18)  T(F): 97.7 (02 Sep 2019 13:48), Max: 98.8 (01 Sep 2019 17:18)  HR: 88 (02 Sep 2019 13:48) (74 - 95)  BP: 121/43 (02 Sep 2019 13:48) (117/76 - 133/79)  BP(mean): --  RR: 16 (02 Sep 2019 13:48) (16 - 17)  SpO2: 99% (02 Sep 2019 13:48) (94% - 100%)    LABS/DIAGNOSTIC TESTS:                          9.0    4.75  )-----------( 189      ( 02 Sep 2019 06:35 )             29.2     WBC Count: 4.75 K/uL (09-02 @ 06:35)  WBC Count: 5.31 K/uL (09-01 @ 06:54)  WBC Count: 5.96 K/uL (08-31 @ 06:53)      09-02    142  |  110<H>  |  6<L>  ----------------------------<  89  3.8   |  26  |  0.66    Ca    9.1      02 Sep 2019 06:35                    LACTATE:    ABG -     CULTURES:   .Stool yudi lorena  09-01 @ 12:13   Enteropathogenic E. coli (EPEC)  Campylobacter species  DETECTED by PCR  *******Please Note:*******  GI panel PCR evaluates for:  Campylobacter, Plesiomonas shigelloides, Salmonella,  Vibrio, Yersinia enterocolitica, Enteroaggregative  Escherichia coli (EAEC), Enteropathogenic E.coli (EPEC),  Enterotoxigenic E. coli (ETEC) lt/st, Shiga-like  toxin-producing E. coli (STEC) stx1/stx2,  Shigella/ Enteroinvasive E. coli (EIEC), Cryptosporidium,  Cyclospora cayetanensis, Entamoeba histolytica,  Giardia lamblia, Adenovirus F 40/41, Astrovirus,  Norovirus GI/GII, Rotavirus A, Sapovirus  --  --      .Stool yudi lorena  08-31 @ 09:37   Moderate Campylobacter jejuni  --  --      .Urine  08-31 @ 09:21   <10,000 CFU/mL Normal Urogenital Cami  --  --      .Blood  08-31 @ 00:07   No growth to date.  --  --      .Blood  08-31 @ 00:06   No growth to date.  --  --          RADIOLOGY:< from: CT Abdomen and Pelvis w/ IV Cont (05.05.18 @ 22:52) >  EXAM:  CT ABDOMEN AND PELVIS IC                            PROCEDURE DATE:  05/05/2018          INTERPRETATION:  CT ABDOMEN AND PELVIS DATED 5/5/2018.    CLINICAL INFORMATION:  Diffuse abdominal pain (left greater than right)..    TECHNIQUE:  AxialCT images through the abdomen and pelvis are acquired   with administration of intravenous contrast. Images are reformatted in   the sagittal and coronal planes. 90cc of Omnipaque 350 were administered   without event.  10cc were discarded.    The study is correlated with report from a prior examination from   5/4/2018.    FINDINGS:    There is mild bibasilar dependent and platelike subsegmental atelectasis.    There is mild diffuse hepatic steatosis. The gallbladder is normally   distended. There are no radiodense gallstones. There is no biliary ductal   dilatation. The pancreas, spleen, adrenal glands, and kidneys are   unremarkable apart from a few tiny subcentimeter low-attenuation lesions   in both kidneys which are too small to characterize. The urinary bladder   is unremarkable in appearance. The uterus and adnexa are unremarkable.   There is a small amount of pelvic free fluid.    There is a small hiatus hernia. There is no bowel obstruction, overt   bowel wall thickening, or mesenteric inflammatory change. The appendix is   not discretely identified, however, there are no secondary CT findings to   suggest appendicitis. There is no pneumoperitoneum or loculated   collection to suggest abscess.    There is no significant abdominal or pelvic lymphadenopathy. There is   atherosclerotic calcification of the aortoiliac tree. The abdominal aorta   is normal in caliber.    There is a small fat-containing right inguinal hernia. There is a tiny   fat-containing umbilical hernia.    The bones are diffusely demineralized. There are mild multilevel   degenerative changes of the spine.    IMPRESSION:    No acute findings on CT of the abdomen and pelvis to explain patient's   abdominal pain..      < end of copied text >        ROS  [  ] UNABLE TO ELICIT HPI:  Patient is 74 year old female has past medical history of migraines, hld, chronic urinary hesitancy was sent from pcp because of fever associated with generalized weakness and difficulty walking.   Per pt she was in her usual health a week ago, when she started to have diarrhea, non bloody, watery, pt has have 4 episode of water diarrhea since she came to the hospital, pt started to feel weak since last night but today morning she woke up pt felt very weak and she could not walk properly she was swaying, but never felt she was about to fall. pt went to pcp today morning where she had rectal temp of 102 and ekg was abnormal. pt was sent to ED.  Pt denies dizziness, unusual headache vision problem, ear problem, cough, cp, sob, palpitation, carmpy abdominal pain, no recent travel no recent antibiotic use, no sore throat, no unusual eating, dysuria, hematuria muscle pain or joint pain. Pt states last week she notice mosquito bite on her leg, hand and arm. pt states she was taking flexeril (unknown dose) at bedtime for last 2 weeks and she was not prescribed flexeril she was taking it form relative.  ED course pt was code sepsis in the ED, temperature and tachycardia, lactate was normal, blood and urine was sent, u/a negative, pt got IV Fluid and zosyn and vancomycin, after getting IVF in the ED, pt states she feels better and she does not have weakness anymore. (30 Aug 2019 22:01)    History as above, the patient started having diarrhea about 2 weeks ago and abdominal discomfort which no one else in her family developed, she does not recall eating anything out of the ordinary, she does feel a little nauseas, she had a fever to 102.1 on admission here. Her CT scan showed no abnormalities but her stool culture shows E. Coli and Campylobacter. She has been on cipro and flagyl and has been doing a little better but still symptomatic.      PAST MEDICAL & SURGICAL HISTORY:  GERD (gastroesophageal reflux disease)  HLD (hyperlipidemia)  No significant past surgical history      No Known Allergies      Meds:  acetaminophen   Tablet .. 650 milliGRAM(s) Oral every 6 hours PRN  atorvastatin 10 milliGRAM(s) Oral at bedtime  ciprofloxacin   IVPB 400 milliGRAM(s) IV Intermittent every 12 hours  ciprofloxacin   IVPB      heparin  Injectable 5000 Unit(s) SubCutaneous every 12 hours  metroNIDAZOLE  IVPB      metroNIDAZOLE  IVPB 500 milliGRAM(s) IV Intermittent every 8 hours      SOCIAL HISTORY:  Smoker:  YES / NO        PACK YEARS:                         WHEN QUIT?  ETOH use:  YES / NO               FREQUENCY / QUANTITY:  Ilicit Drug use:  YES / NO  Occupation:  Assisted device use (Cane / Walker):  Live with:    FAMILY HISTORY:      VITALS:  Vital Signs Last 24 Hrs  T(C): 36.5 (02 Sep 2019 13:48), Max: 37.1 (01 Sep 2019 17:18)  T(F): 97.7 (02 Sep 2019 13:48), Max: 98.8 (01 Sep 2019 17:18)  HR: 88 (02 Sep 2019 13:48) (74 - 95)  BP: 121/43 (02 Sep 2019 13:48) (117/76 - 133/79)  BP(mean): --  RR: 16 (02 Sep 2019 13:48) (16 - 17)  SpO2: 99% (02 Sep 2019 13:48) (94% - 100%)    LABS/DIAGNOSTIC TESTS:                          9.0    4.75  )-----------( 189      ( 02 Sep 2019 06:35 )             29.2     WBC Count: 4.75 K/uL (09-02 @ 06:35)  WBC Count: 5.31 K/uL (09-01 @ 06:54)  WBC Count: 5.96 K/uL (08-31 @ 06:53)      09-02    142  |  110<H>  |  6<L>  ----------------------------<  89  3.8   |  26  |  0.66    Ca    9.1      02 Sep 2019 06:35                    LACTATE:    ABG -     CULTURES:   .Stool yudi carrillo  09-01 @ 12:13   Enteropathogenic E. coli (EPEC)  Campylobacter species  DETECTED by PCR  *******Please Note:*******  GI panel PCR evaluates for:  Campylobacter, Plesiomonas shigelloides, Salmonella,  Vibrio, Yersinia enterocolitica, Enteroaggregative  Escherichia coli (EAEC), Enteropathogenic E.coli (EPEC),  Enterotoxigenic E. coli (ETEC) lt/st, Shiga-like  toxin-producing E. coli (STEC) stx1/stx2,  Shigella/ Enteroinvasive E. coli (EIEC), Cryptosporidium,  Cyclospora cayetanensis, Entamoeba histolytica,  Giardia lamblia, Adenovirus F 40/41, Astrovirus,  Norovirus GI/GII, Rotavirus A, Sapovirus  --  --      .Stool yudi lorena  08-31 @ 09:37   Moderate Campylobacter jejuni  --  --      .Urine  08-31 @ 09:21   <10,000 CFU/mL Normal Urogenital Cami  --  --      .Blood  08-31 @ 00:07   No growth to date.  --  --      .Blood  08-31 @ 00:06   No growth to date.  --  --          RADIOLOGY:< from: CT Abdomen and Pelvis w/ IV Cont (05.05.18 @ 22:52) >  EXAM:  CT ABDOMEN AND PELVIS IC                            PROCEDURE DATE:  05/05/2018          INTERPRETATION:  CT ABDOMEN AND PELVIS DATED 5/5/2018.    CLINICAL INFORMATION:  Diffuse abdominal pain (left greater than right)..    TECHNIQUE:  AxialCT images through the abdomen and pelvis are acquired   with administration of intravenous contrast. Images are reformatted in   the sagittal and coronal planes. 90cc of Omnipaque 350 were administered   without event.  10cc were discarded.    The study is correlated with report from a prior examination from   5/4/2018.    FINDINGS:    There is mild bibasilar dependent and platelike subsegmental atelectasis.    There is mild diffuse hepatic steatosis. The gallbladder is normally   distended. There are no radiodense gallstones. There is no biliary ductal   dilatation. The pancreas, spleen, adrenal glands, and kidneys are   unremarkable apart from a few tiny subcentimeter low-attenuation lesions   in both kidneys which are too small to characterize. The urinary bladder   is unremarkable in appearance. The uterus and adnexa are unremarkable.   There is a small amount of pelvic free fluid.    There is a small hiatus hernia. There is no bowel obstruction, overt   bowel wall thickening, or mesenteric inflammatory change. The appendix is   not discretely identified, however, there are no secondary CT findings to   suggest appendicitis. There is no pneumoperitoneum or loculated   collection to suggest abscess.    There is no significant abdominal or pelvic lymphadenopathy. There is   atherosclerotic calcification of the aortoiliac tree. The abdominal aorta   is normal in caliber.    There is a small fat-containing right inguinal hernia. There is a tiny   fat-containing umbilical hernia.    The bones are diffusely demineralized. There are mild multilevel   degenerative changes of the spine.    IMPRESSION:    No acute findings on CT of the abdomen and pelvis to explain patient's   abdominal pain..      < end of copied text >        ROS  [  ] UNABLE TO ELICIT HPI:  Patient is 74 year old female has past medical history of migraines, hld, chronic urinary hesitancy was sent from pcp because of fever associated with generalized weakness and difficulty walking.   Per pt she was in her usual health a week ago, when she started to have diarrhea, non bloody, watery, pt has have 4 episode of water diarrhea since she came to the hospital, pt started to feel weak since last night but today morning she woke up pt felt very weak and she could not walk properly she was swaying, but never felt she was about to fall. pt went to pcp today morning where she had rectal temp of 102 and ekg was abnormal. pt was sent to ED.  Pt denies dizziness, unusual headache vision problem, ear problem, cough, cp, sob, palpitation, carmpy abdominal pain, no recent travel no recent antibiotic use, no sore throat, no unusual eating, dysuria, hematuria muscle pain or joint pain. Pt states last week she notice mosquito bite on her leg, hand and arm. pt states she was taking flexeril (unknown dose) at bedtime for last 2 weeks and she was not prescribed flexeril she was taking it form relative.  ED course pt was code sepsis in the ED, temperature and tachycardia, lactate was normal, blood and urine was sent, u/a negative, pt got IV Fluid and zosyn and vancomycin, after getting IVF in the ED, pt states she feels better and she does not have weakness anymore. (30 Aug 2019 22:01)    History as above, the patient started having diarrhea about 2 weeks ago and abdominal discomfort which no one else in her family developed, she does not recall eating anything out of the ordinary, she does feel a little nauseas, she had a fever to 102.1 on admission here. Her CT scan showed no abnormalities but her stool culture shows E. Coli and Campylobacter. She has been on cipro and flagyl and has been doing a little better but still symptomatic.      PAST MEDICAL & SURGICAL HISTORY:  GERD (gastroesophageal reflux disease)  HLD (hyperlipidemia)  No significant past surgical history      No Known Allergies      Meds:  acetaminophen   Tablet .. 650 milliGRAM(s) Oral every 6 hours PRN  atorvastatin 10 milliGRAM(s) Oral at bedtime  ciprofloxacin   IVPB 400 milliGRAM(s) IV Intermittent every 12 hours  ciprofloxacin   IVPB      heparin  Injectable 5000 Unit(s) SubCutaneous every 12 hours  metroNIDAZOLE  IVPB      metroNIDAZOLE  IVPB 500 milliGRAM(s) IV Intermittent every 8 hours      SOCIAL HISTORY:  Smoker:  no  ETOH use:  no    FAMILY HISTORY: not significant      VITALS:  Vital Signs Last 24 Hrs  T(C): 36.5 (02 Sep 2019 13:48), Max: 37.1 (01 Sep 2019 17:18)  T(F): 97.7 (02 Sep 2019 13:48), Max: 98.8 (01 Sep 2019 17:18)  HR: 88 (02 Sep 2019 13:48) (74 - 95)  BP: 121/43 (02 Sep 2019 13:48) (117/76 - 133/79)  BP(mean): --  RR: 16 (02 Sep 2019 13:48) (16 - 17)  SpO2: 99% (02 Sep 2019 13:48) (94% - 100%)    LABS/DIAGNOSTIC TESTS:                          9.0    4.75  )-----------( 189      ( 02 Sep 2019 06:35 )             29.2     WBC Count: 4.75 K/uL (09-02 @ 06:35)  WBC Count: 5.31 K/uL (09-01 @ 06:54)  WBC Count: 5.96 K/uL (08-31 @ 06:53)      09-02    142  |  110<H>  |  6<L>  ----------------------------<  89  3.8   |  26  |  0.66    Ca    9.1      02 Sep 2019 06:35                    LACTATE:    ABG -     CULTURES:   .Stool yudi carrillo  09-01 @ 12:13   Enteropathogenic E. coli (EPEC)  Campylobacter species  DETECTED by PCR  *******Please Note:*******  GI panel PCR evaluates for:  Campylobacter, Plesiomonas shigelloides, Salmonella,  Vibrio, Yersinia enterocolitica, Enteroaggregative  Escherichia coli (EAEC), Enteropathogenic E.coli (EPEC),  Enterotoxigenic E. coli (ETEC) lt/st, Shiga-like  toxin-producing E. coli (STEC) stx1/stx2,  Shigella/ Enteroinvasive E. coli (EIEC), Cryptosporidium,  Cyclospora cayetanensis, Entamoeba histolytica,  Giardia lamblia, Adenovirus F 40/41, Astrovirus,  Norovirus GI/GII, Rotavirus A, Sapovirus  --  --      .Stool yudi lorena  08-31 @ 09:37   Moderate Campylobacter jejuni  --  --      .Urine  08-31 @ 09:21   <10,000 CFU/mL Normal Urogenital Cami  --  --      .Blood  08-31 @ 00:07   No growth to date.  --  --      .Blood  08-31 @ 00:06   No growth to date.  --  --          RADIOLOGY:< from: CT Abdomen and Pelvis w/ IV Cont (05.05.18 @ 22:52) >  EXAM:  CT ABDOMEN AND PELVIS IC                            PROCEDURE DATE:  05/05/2018          INTERPRETATION:  CT ABDOMEN AND PELVIS DATED 5/5/2018.    CLINICAL INFORMATION:  Diffuse abdominal pain (left greater than right)..    TECHNIQUE:  AxialCT images through the abdomen and pelvis are acquired   with administration of intravenous contrast. Images are reformatted in   the sagittal and coronal planes. 90cc of Omnipaque 350 were administered   without event.  10cc were discarded.    The study is correlated with report from a prior examination from   5/4/2018.    FINDINGS:    There is mild bibasilar dependent and platelike subsegmental atelectasis.    There is mild diffuse hepatic steatosis. The gallbladder is normally   distended. There are no radiodense gallstones. There is no biliary ductal   dilatation. The pancreas, spleen, adrenal glands, and kidneys are   unremarkable apart from a few tiny subcentimeter low-attenuation lesions   in both kidneys which are too small to characterize. The urinary bladder   is unremarkable in appearance. The uterus and adnexa are unremarkable.   There is a small amount of pelvic free fluid.    There is a small hiatus hernia. There is no bowel obstruction, overt   bowel wall thickening, or mesenteric inflammatory change. The appendix is   not discretely identified, however, there are no secondary CT findings to   suggest appendicitis. There is no pneumoperitoneum or loculated   collection to suggest abscess.    There is no significant abdominal or pelvic lymphadenopathy. There is   atherosclerotic calcification of the aortoiliac tree. The abdominal aorta   is normal in caliber.    There is a small fat-containing right inguinal hernia. There is a tiny   fat-containing umbilical hernia.    The bones are diffusely demineralized. There are mild multilevel   degenerative changes of the spine.    IMPRESSION:    No acute findings on CT of the abdomen and pelvis to explain patient's   abdominal pain..      < end of copied text >        ROS  [  ] UNABLE TO ELICIT

## 2019-09-02 NOTE — PROGRESS NOTE ADULT - PROBLEM SELECTOR PLAN 4
Generalized weakness likely from dehydration from diarrhea   which resolved after ivf  CT Head and Neck Angio -ve

## 2019-09-02 NOTE — PROGRESS NOTE ADULT - SUBJECTIVE AND OBJECTIVE BOX
[   ] ICU                                          [   ] CCU                                      [  X ] Medical Floor      Patient is comfortable. No new complaints reported, No abdominal pain, N/V, hematemesis, hematochezia, melena, fever, chills, chest pain, SOB, cough or diarrhea reported.    VITALS  Vital Signs Last 24 Hrs  T(C): 36.5 (02 Sep 2019 13:48), Max: 37.1 (01 Sep 2019 20:38)  T(F): 97.7 (02 Sep 2019 13:48), Max: 98.7 (01 Sep 2019 20:38)  HR: 88 (02 Sep 2019 13:48) (74 - 95)  BP: 121/43 (02 Sep 2019 13:48) (117/76 - 133/79)   RR: 16 (02 Sep 2019 13:48) (16 - 17)  SpO2: 99% (02 Sep 2019 13:48) (94% - 100%)         MEDICATIONS  (STANDING):  atorvastatin 10 milliGRAM(s) Oral at bedtime  azithromycin   Tablet 500 milliGRAM(s) Oral daily  ciprofloxacin   IVPB 400 milliGRAM(s) IV Intermittent every 12 hours  ciprofloxacin   IVPB      heparin  Injectable 5000 Unit(s) SubCutaneous every 12 hours    MEDICATIONS  (PRN):  acetaminophen   Tablet .. 650 milliGRAM(s) Oral every 6 hours PRN Temp greater or equal to 38C (100.4F)                            9.0    4.75  )-----------( 189      ( 02 Sep 2019 06:35 )             29.2       09-02    142  |  110<H>  |  6<L>  ----------------------------<  89  3.8   |  26  |  0.66    Ca    9.1      02 Sep 2019 06:35    GI PCR Panel, Stool (09.01.19 @ 12:13)    Specimen Source: .Stool yudi lorena    Culture Results:   Enteropathogenic E. coli (EPEC)  Campylobacter species  DETECTED by PCR  *******Please Note:*******  GI panel PCR evaluates for:  Campylobacter, Plesiomonas shigelloides, Salmonella,  Vibrio, Yersinia enterocolitica, Enteroaggregative  Escherichia coli (EAEC), Enteropathogenic E.coli (EPEC),  Enterotoxigenic E. coli (ETEC) lt/st, Shiga-like  toxin-producing E. coli (STEC) stx1/stx2,  Shigella/ Enteroinvasive E. coli (EIEC), Cryptosporidium,  Cyclospora cayetanensis, Entamoeba histolytica,  Giardia lamblia, Adenovirus F 40/41, Astrovirus,  Norovirus GI/GII, Rotavirus A, Sapovirus

## 2019-09-02 NOTE — PROGRESS NOTE ADULT - PROBLEM SELECTOR PLAN 3
Hb 10.3--->9.1   Total iron, TIBC, Iron saturation is low; ferritin high  Folate, Vit B12 normal  FOBT +  pt states she has schwarz anemia   pt had colonoscopy 5 years ago which showed benign polyp
Hb 10.3--->9.1 --->8.9--->9  iron saturation is low; ferritin high  Folate, Vit B12 normal  FOBT +  pt states she has schwarz anemia   pt had colonoscopy 5 years ago which showed benign polyp  Colonoscopy tomorrow  NPO after midnight
Hb 10.3--->9.1   Total iron, TIBC, Iron saturation is low; ferritin high  Folate, Vit B12 normal  FOBT +  pt states she has schwarz anemia   pt had colonoscopy 5 years ago which showed benign polyp

## 2019-09-03 ENCOUNTER — TRANSCRIPTION ENCOUNTER (OUTPATIENT)
Age: 74
End: 2019-09-03

## 2019-09-03 VITALS
SYSTOLIC BLOOD PRESSURE: 118 MMHG | TEMPERATURE: 98 F | HEART RATE: 87 BPM | OXYGEN SATURATION: 96 % | DIASTOLIC BLOOD PRESSURE: 74 MMHG | RESPIRATION RATE: 19 BRPM

## 2019-09-03 LAB
ANION GAP SERPL CALC-SCNC: 8 MMOL/L — SIGNIFICANT CHANGE UP (ref 5–17)
BASOPHILS # BLD AUTO: 0 K/UL — SIGNIFICANT CHANGE UP (ref 0–0.2)
BASOPHILS NFR BLD AUTO: 0 % — SIGNIFICANT CHANGE UP (ref 0–2)
BUN SERPL-MCNC: 6 MG/DL — LOW (ref 7–18)
CALCIUM SERPL-MCNC: 8.9 MG/DL — SIGNIFICANT CHANGE UP (ref 8.4–10.5)
CHLORIDE SERPL-SCNC: 106 MMOL/L — SIGNIFICANT CHANGE UP (ref 96–108)
CO2 SERPL-SCNC: 27 MMOL/L — SIGNIFICANT CHANGE UP (ref 22–31)
CREAT SERPL-MCNC: 0.76 MG/DL — SIGNIFICANT CHANGE UP (ref 0.5–1.3)
CULTURE RESULTS: SIGNIFICANT CHANGE UP
ELLIPTOCYTES BLD QL SMEAR: SLIGHT — SIGNIFICANT CHANGE UP
EOSINOPHIL # BLD AUTO: 0.05 K/UL — SIGNIFICANT CHANGE UP (ref 0–0.5)
EOSINOPHIL NFR BLD AUTO: 1 % — SIGNIFICANT CHANGE UP (ref 0–6)
GLUCOSE SERPL-MCNC: 110 MG/DL — HIGH (ref 70–99)
HCT VFR BLD CALC: 29.7 % — LOW (ref 34.5–45)
HGB BLD-MCNC: 9.3 G/DL — LOW (ref 11.5–15.5)
HYPOCHROMIA BLD QL: SLIGHT — SIGNIFICANT CHANGE UP
INR BLD: 1.17 RATIO — HIGH (ref 0.88–1.16)
LYMPHOCYTES # BLD AUTO: 2.02 K/UL — SIGNIFICANT CHANGE UP (ref 1–3.3)
LYMPHOCYTES # BLD AUTO: 39 % — SIGNIFICANT CHANGE UP (ref 13–44)
MANUAL SMEAR VERIFICATION: SIGNIFICANT CHANGE UP
MCHC RBC-ENTMCNC: 19.8 PG — LOW (ref 27–34)
MCHC RBC-ENTMCNC: 31.3 GM/DL — LOW (ref 32–36)
MCV RBC AUTO: 63.3 FL — LOW (ref 80–100)
METAMYELOCYTES # FLD: 1 % — HIGH (ref 0–0)
MICROCYTES BLD QL: SIGNIFICANT CHANGE UP
MONOCYTES # BLD AUTO: 0.47 K/UL — SIGNIFICANT CHANGE UP (ref 0–0.9)
MONOCYTES NFR BLD AUTO: 9 % — SIGNIFICANT CHANGE UP (ref 2–14)
NEUTROPHILS # BLD AUTO: 2.34 K/UL — SIGNIFICANT CHANGE UP (ref 1.8–7.4)
NEUTROPHILS NFR BLD AUTO: 45 % — SIGNIFICANT CHANGE UP (ref 43–77)
NRBC # BLD: 1 /100 — HIGH (ref 0–0)
OVALOCYTES BLD QL SMEAR: SLIGHT — SIGNIFICANT CHANGE UP
PLAT MORPH BLD: NORMAL — SIGNIFICANT CHANGE UP
PLATELET # BLD AUTO: 233 K/UL — SIGNIFICANT CHANGE UP (ref 150–400)
POIKILOCYTOSIS BLD QL AUTO: SLIGHT — SIGNIFICANT CHANGE UP
POLYCHROMASIA BLD QL SMEAR: SLIGHT — SIGNIFICANT CHANGE UP
POTASSIUM SERPL-MCNC: 3.5 MMOL/L — SIGNIFICANT CHANGE UP (ref 3.5–5.3)
POTASSIUM SERPL-SCNC: 3.5 MMOL/L — SIGNIFICANT CHANGE UP (ref 3.5–5.3)
PROTHROM AB SERPL-ACNC: 13 SEC — HIGH (ref 10–12.9)
RBC # BLD: 4.69 M/UL — SIGNIFICANT CHANGE UP (ref 3.8–5.2)
RBC # FLD: 15.7 % — HIGH (ref 10.3–14.5)
RBC BLD AUTO: ABNORMAL
SCHISTOCYTES BLD QL AUTO: SLIGHT — SIGNIFICANT CHANGE UP
SODIUM SERPL-SCNC: 141 MMOL/L — SIGNIFICANT CHANGE UP (ref 135–145)
SPECIMEN SOURCE: SIGNIFICANT CHANGE UP
VARIANT LYMPHS # BLD: 5 % — SIGNIFICANT CHANGE UP (ref 0–6)
WBC # BLD: 5.19 K/UL — SIGNIFICANT CHANGE UP (ref 3.8–10.5)
WBC # FLD AUTO: 5.19 K/UL — SIGNIFICANT CHANGE UP (ref 3.8–10.5)

## 2019-09-03 RX ORDER — AZITHROMYCIN 500 MG/1
1 TABLET, FILM COATED ORAL
Qty: 3 | Refills: 0
Start: 2019-09-03 | End: 2019-09-05

## 2019-09-03 RX ORDER — MOXIFLOXACIN HYDROCHLORIDE TABLETS, 400 MG 400 MG/1
1 TABLET, FILM COATED ORAL
Qty: 6 | Refills: 0
Start: 2019-09-03 | End: 2019-09-05

## 2019-09-03 RX ADMIN — Medication 200 MILLIGRAM(S): at 05:51

## 2019-09-03 RX ADMIN — AZITHROMYCIN 500 MILLIGRAM(S): 500 TABLET, FILM COATED ORAL at 11:30

## 2019-09-03 NOTE — PROGRESS NOTE ADULT - NEGATIVE RESPIRATORY AND THORAX SYMPTOMS
no hemoptysis/no wheezing/no dyspnea/no cough
no dyspnea/no cough/no wheezing/no hemoptysis
no dyspnea/no wheezing/no cough/no hemoptysis

## 2019-09-03 NOTE — PROGRESS NOTE ADULT - CONSTITUTIONAL DETAILS
well-groomed/no distress/well-developed/well-nourished
well-groomed/well-developed/no distress/well-nourished
no distress/well-groomed/well-nourished/well-developed

## 2019-09-03 NOTE — PROGRESS NOTE ADULT - NEGATIVE NEUROLOGICAL SYMPTOMS
no tremors/no headache/no syncope/no vertigo/no loss of sensation
no syncope/no tremors/no vertigo/no loss of sensation/no headache
no loss of sensation/no syncope/no tremors/no vertigo/no headache

## 2019-09-03 NOTE — DISCHARGE NOTE PROVIDER - NSDCCPCAREPLAN_GEN_ALL_CORE_FT
PRINCIPAL DISCHARGE DIAGNOSIS  Diagnosis: Gastroenteritis  Assessment and Plan of Treatment: You came to the hospital due to fever, generalised weakness, watwery diarrhea. Stool yudi lorena showed infection with EPEC and campylobacter species. Your urine culture and blood culture showed no growth. You were treated with ciprofloxacin and azithromycin. Continue with antibiotics. See your primary care doctor.      SECONDARY DISCHARGE DIAGNOSES  Diagnosis: Anemia  Assessment and Plan of Treatment: Your Hb was 10.3 on admission. Your iron levels are low. Stool occult showed blood that can be due to bacterial infection. Follow with dr. Miller for colonoscopy as an outpatient.    Diagnosis: HLD (hyperlipidemia)  Assessment and Plan of Treatment: Continue taking statin. Eat healthy diet rich in fruits and vegetables. avoid fatty and salty food. excer PRINCIPAL DISCHARGE DIAGNOSIS  Diagnosis: Gastroenteritis  Assessment and Plan of Treatment: You came to the hospital due to fever, generalised weakness, watwery diarrhea. Stool yudi lorena showed infection with EPEC and campylobacter species. Your urine culture and blood culture showed no growth. You were treated with ciprofloxacin and azithromycin for 3 more days. Continue with antibiotics. See your primary care doctor.      SECONDARY DISCHARGE DIAGNOSES  Diagnosis: Anemia  Assessment and Plan of Treatment: Your Hb was 10.3 on admission. Your iron levels are low. Stool occult showed blood that can be due to bacterial infection. Follow with dr. Miller for colonoscopy as an outpatient.    Diagnosis: HLD (hyperlipidemia)  Assessment and Plan of Treatment: Continue taking statin. Eat healthy diet rich in fruits and vegetables. avoid fatty and salty food. Excercise regularly.

## 2019-09-03 NOTE — PROGRESS NOTE ADULT - NEGATIVE PSYCHIATRIC SYMPTOMS
no agitation/no depression/no insomnia/no anxiety/no suicidal ideation
no agitation/no suicidal ideation/no depression/no anxiety/no insomnia
no anxiety/no suicidal ideation/no insomnia/no agitation/no depression

## 2019-09-03 NOTE — PROGRESS NOTE ADULT - NEGATIVE GASTROINTESTINAL SYMPTOMS
no melena/no jaundice/no abdominal pain/no hematochezia/no vomiting/no nausea
no melena/no jaundice/no nausea/no vomiting/no hematochezia/no abdominal pain
no nausea/no hematochezia/no vomiting/no abdominal pain/no melena/no jaundice

## 2019-09-03 NOTE — DISCHARGE NOTE PROVIDER - CARE PROVIDER_API CALL
Everett Miller)  Medicine  [address] [place], [place] [address]  Phone: [phone]  Fax: [phone]  Follow Up Time:

## 2019-09-03 NOTE — PROGRESS NOTE ADULT - NEUROLOGICAL DETAILS
alert and oriented x 3/responds to pain/sensation intact/cranial nerves intact/responds to verbal commands
responds to pain/cranial nerves intact/responds to verbal commands/sensation intact/alert and oriented x 3
sensation intact/alert and oriented x 3/responds to pain/responds to verbal commands

## 2019-09-03 NOTE — PROGRESS NOTE ADULT - NEGATIVE ENMT SYMPTOMS
no hearing difficulty/no ear pain/no gum bleeding/no throat pain/no dysphagia/no nose bleeds/no dry mouth
no throat pain/no dysphagia/no hearing difficulty/no nose bleeds/no gum bleeding/no dry mouth/no ear pain
no dysphagia/no ear pain/no gum bleeding/no throat pain/no hearing difficulty/no nose bleeds/no dry mouth

## 2019-09-03 NOTE — PROGRESS NOTE ADULT - RS GEN PE MLT RESP DETAILS PC
airway patent/breath sounds equal/respirations non-labored/good air movement
airway patent/good air movement/no rhonchi/breath sounds equal/no rales
no rales/breath sounds equal/no rhonchi/airway patent/good air movement

## 2019-09-03 NOTE — DISCHARGE NOTE NURSING/CASE MANAGEMENT/SOCIAL WORK - PATIENT PORTAL LINK FT
You can access the FollowMyHealth Patient Portal offered by [facility] by registering at the following website: [email or web address]. By joining [employer]’s FollowMyHealth portal, you will also be able to view your health information using other applications (apps) compatible with our system.

## 2019-09-03 NOTE — PROGRESS NOTE ADULT - GASTROINTESTINAL DETAILS
no guarding/soft/bowel sounds normal/no rigidity/no rebound tenderness/no distention/nontender
soft/nontender/no guarding/bowel sounds normal/no distention/no rebound tenderness/no rigidity
no rebound tenderness/bowel sounds normal/no rigidity/no guarding/soft/nontender/no distention

## 2019-09-03 NOTE — PROGRESS NOTE ADULT - NEGATIVE GENERAL SYMPTOMS
no fever/no chills/no anorexia/no sweating
no chills/no fever/no sweating/no anorexia
no fever/no chills/no sweating/no anorexia

## 2019-09-03 NOTE — PROGRESS NOTE ADULT - NEGATIVE GENERAL GENITOURINARY SYMPTOMS
no hematuria/no urinary hesitancy/no renal colic
no hematuria/no urinary hesitancy/no renal colic
no renal colic/no urinary hesitancy/no hematuria

## 2019-09-03 NOTE — PROGRESS NOTE ADULT - SUBJECTIVE AND OBJECTIVE BOX
[   ] ICU                                          [   ] CCU                                      [ X  ] Medical Floor    Patient is comfortable. No new complaints reported, No abdominal pain, N/V, hematemesis, hematochezia, melena, fever, chills, chest pain, SOB, cough or diarrhea reported.    VITALS  ICU Vital Signs Last 24 Hrs  T(C): 36.6 (03 Sep 2019 10:29), Max: 36.8 (02 Sep 2019 18:42)  T(F): 97.9 (03 Sep 2019 10:29), Max: 98.3 (02 Sep 2019 21:11)  HR: 87 (03 Sep 2019 10:29) (79 - 92)  BP: 118/74 (03 Sep 2019 10:29) (118/72 - 145/77)   RR: 19 (03 Sep 2019 10:29) (16 - 19)  SpO2: 96% (03 Sep 2019 10:29) (95% - 96%)         MEDICATIONS  (STANDING):  atorvastatin 10 milliGRAM(s) Oral at bedtime  azithromycin   Tablet 500 milliGRAM(s) Oral daily  ciprofloxacin   IVPB 400 milliGRAM(s) IV Intermittent every 12 hours  ciprofloxacin   IVPB      heparin  Injectable 5000 Unit(s) SubCutaneous every 12 hours    MEDICATIONS  (PRN):  acetaminophen   Tablet .. 650 milliGRAM(s) Oral every 6 hours PRN Temp greater or equal to 38C (100.4F)                            9.3    5.19  )-----------( 233      ( 03 Sep 2019 07:25 )             29.7       09-03    141  |  106  |  6<L>  ----------------------------<  110<H>  3.5   |  27  |  0.76    Ca    8.9      03 Sep 2019 07:25        PT/INR - ( 03 Sep 2019 07:25 )   PT: 13.0 sec;   INR: 1.17 ratio [   ] ICU                                          [   ] CCU                                      [ X  ] Medical Floor    (Patient seen and examined earlier in the morning)    Patient is comfortable. No new complaints reported, No abdominal pain, N/V, hematemesis, hematochezia, melena, fever, chills, chest pain, SOB, cough or diarrhea reported.    VITALS  ICU Vital Signs Last 24 Hrs  T(C): 36.6 (03 Sep 2019 10:29), Max: 36.8 (02 Sep 2019 18:42)  T(F): 97.9 (03 Sep 2019 10:29), Max: 98.3 (02 Sep 2019 21:11)  HR: 87 (03 Sep 2019 10:29) (79 - 92)  BP: 118/74 (03 Sep 2019 10:29) (118/72 - 145/77)   RR: 19 (03 Sep 2019 10:29) (16 - 19)  SpO2: 96% (03 Sep 2019 10:29) (95% - 96%)         MEDICATIONS  (STANDING):  atorvastatin 10 milliGRAM(s) Oral at bedtime  azithromycin   Tablet 500 milliGRAM(s) Oral daily  ciprofloxacin   IVPB 400 milliGRAM(s) IV Intermittent every 12 hours  ciprofloxacin   IVPB      heparin  Injectable 5000 Unit(s) SubCutaneous every 12 hours    MEDICATIONS  (PRN):  acetaminophen   Tablet .. 650 milliGRAM(s) Oral every 6 hours PRN Temp greater or equal to 38C (100.4F)                            9.3    5.19  )-----------( 233      ( 03 Sep 2019 07:25 )             29.7       09-03    141  |  106  |  6<L>  ----------------------------<  110<H>  3.5   |  27  |  0.76    Ca    8.9      03 Sep 2019 07:25        PT/INR - ( 03 Sep 2019 07:25 )   PT: 13.0 sec;   INR: 1.17 ratio

## 2019-09-03 NOTE — PROGRESS NOTE ADULT - ASSESSMENT
Patient is 74 year old female has past medical history of migraines, hld, chronic urinary hesitancy was sent from pcp because of fever associated with generalized weakness and difficulty walking.
1. Diarrhea improved  2. Positive occult blood in stool  3. Anemia  4. No evidence of acute GI bleeding    Suggestions:    1. Follow up stool study   2. Monitor electrolytes  3. Monitor H/H  4. Transfuse PRBC as needed  5. Antibiotics as per ID  6. Protonix daily  7. Colonoscopy  8. DVT prophylaxis
1. Infectious Diarrhea  2. Positive occult blood in stool  3. Anemia  4. No evidence of acute GI bleeding    Suggestions:    1. Continue antibiotics as per ID   2. Monitor electrolytes  3. Monitor H/H  4. Transfuse PRBC as needed  5. Advance diet as tolerated  6. Protonix daily  7. Colonoscopy out patient  8. DVT prophylaxis
1. Infectious Diarrhea improving  2. Positive occult blood in stool  3. Anemia  4. No evidence of acute GI bleeding    Suggestions:    1. Continue antibiotics as per ID   2. Monitor electrolytes  3. Monitor H/H  4. Transfuse PRBC as needed  5. Advance diet as tolerated  6. Protonix daily  7. Colonoscopy out patient  8. DVT prophylaxis

## 2019-09-03 NOTE — PROGRESS NOTE ADULT - NEGATIVE CARDIOVASCULAR SYMPTOMS
no peripheral edema/no palpitations/no orthopnea/no chest pain
no orthopnea/no palpitations/no chest pain/no peripheral edema
no palpitations/no orthopnea/no peripheral edema/no chest pain

## 2019-09-03 NOTE — DISCHARGE NOTE PROVIDER - HOSPITAL COURSE
Patient is 74 year old female has past medical history of migraines, hld, chronic urinary hesitancy was sent from pcp because of fever associated with generalized weakness and difficulty walking.     Per pt she was in her usual health a week ago, when she started to have diarrhea, non bloody, watery, pt has have 4 episode of water diarrhea since she came to the hospital, pt started to feel weak since last night but today morning she woke up pt felt very weak and she could not walk properly she was swaying, but never felt she was about to fall. pt went to pcp today morning where she had rectal temp of 102 and ekg was abnormal. pt was sent to ED.    Pt denies dizziness, unusual headache vision problem, ear problem, cough, cp, sob, palpitation, crampy abdominal pain, no recent travel no recent antibiotic use, no sore throat, no unusual eating, dysuria, hematuria muscle pain or joint pain. Pt states last week she notice mosquito bite on her leg, hand and arm. pt states she was taking flexeril (unknown dose) at bedtime for last 2 weeks and she was not prescribed flexeril she was taking it form relative.    CTA Head and Neck was negative for any intracranial pathology.    Stool yudi lorena showed Enteropathogenic E.coli and and campylobacter species. Pt is being treated with ciprofloxacin and azithromycin.

## 2019-09-03 NOTE — PROGRESS NOTE ADULT - NEGATIVE MUSCULOSKELETAL SYMPTOMS
no muscle weakness/no muscle cramps/no stiffness/no neck pain
no muscle weakness/no muscle cramps/no stiffness/no neck pain
no stiffness/no muscle weakness/no neck pain/no muscle cramps

## 2019-09-05 LAB
CULTURE RESULTS: SIGNIFICANT CHANGE UP
CULTURE RESULTS: SIGNIFICANT CHANGE UP
SPECIMEN SOURCE: SIGNIFICANT CHANGE UP
SPECIMEN SOURCE: SIGNIFICANT CHANGE UP

## 2019-09-10 LAB
CULTURE RESULTS: SIGNIFICANT CHANGE UP
SPECIMEN SOURCE: SIGNIFICANT CHANGE UP

## 2019-09-19 PROCEDURE — 87493 C DIFF AMPLIFIED PROBE: CPT

## 2019-09-19 PROCEDURE — 82272 OCCULT BLD FECES 1-3 TESTS: CPT

## 2019-09-19 PROCEDURE — 83605 ASSAY OF LACTIC ACID: CPT

## 2019-09-19 PROCEDURE — 71045 X-RAY EXAM CHEST 1 VIEW: CPT

## 2019-09-19 PROCEDURE — 99285 EMERGENCY DEPT VISIT HI MDM: CPT | Mod: 25

## 2019-09-19 PROCEDURE — 84100 ASSAY OF PHOSPHORUS: CPT

## 2019-09-19 PROCEDURE — 84443 ASSAY THYROID STIM HORMONE: CPT

## 2019-09-19 PROCEDURE — 85027 COMPLETE CBC AUTOMATED: CPT

## 2019-09-19 PROCEDURE — 83550 IRON BINDING TEST: CPT

## 2019-09-19 PROCEDURE — 87507 IADNA-DNA/RNA PROBE TQ 12-25: CPT

## 2019-09-19 PROCEDURE — 82728 ASSAY OF FERRITIN: CPT

## 2019-09-19 PROCEDURE — 70496 CT ANGIOGRAPHY HEAD: CPT

## 2019-09-19 PROCEDURE — 83036 HEMOGLOBIN GLYCOSYLATED A1C: CPT

## 2019-09-19 PROCEDURE — 83690 ASSAY OF LIPASE: CPT

## 2019-09-19 PROCEDURE — 87045 FECES CULTURE AEROBIC BACT: CPT

## 2019-09-19 PROCEDURE — 81001 URINALYSIS AUTO W/SCOPE: CPT

## 2019-09-19 PROCEDURE — 80061 LIPID PANEL: CPT

## 2019-09-19 PROCEDURE — 87046 STOOL CULTR AEROBIC BACT EA: CPT

## 2019-09-19 PROCEDURE — 87040 BLOOD CULTURE FOR BACTERIA: CPT

## 2019-09-19 PROCEDURE — 82746 ASSAY OF FOLIC ACID SERUM: CPT

## 2019-09-19 PROCEDURE — 85730 THROMBOPLASTIN TIME PARTIAL: CPT

## 2019-09-19 PROCEDURE — 82607 VITAMIN B-12: CPT

## 2019-09-19 PROCEDURE — 80053 COMPREHEN METABOLIC PANEL: CPT

## 2019-09-19 PROCEDURE — 80048 BASIC METABOLIC PNL TOTAL CA: CPT

## 2019-09-19 PROCEDURE — 87177 OVA AND PARASITES SMEARS: CPT

## 2019-09-19 PROCEDURE — 84484 ASSAY OF TROPONIN QUANT: CPT

## 2019-09-19 PROCEDURE — 83540 ASSAY OF IRON: CPT

## 2019-09-19 PROCEDURE — 85610 PROTHROMBIN TIME: CPT

## 2019-09-19 PROCEDURE — 36415 COLL VENOUS BLD VENIPUNCTURE: CPT

## 2019-09-19 PROCEDURE — 70498 CT ANGIOGRAPHY NECK: CPT

## 2019-09-19 PROCEDURE — 87086 URINE CULTURE/COLONY COUNT: CPT

## 2019-09-19 PROCEDURE — 93005 ELECTROCARDIOGRAM TRACING: CPT

## 2021-01-01 NOTE — ED ADULT NURSE NOTE - CHIEF COMPLAINT
The patient is a 72y Female complaining of vomiting. Acceptable shape position of pinnae/No pits or tags

## 2021-09-17 NOTE — ED ADULT NURSE NOTE - BREATH SOUNDS, MLM
Medications that were prescribed for you today are:  -Please take 1-2 Extra Strength Tylenol (500mg tablets) up to three times daily for your pain (maximum of 3,000mg per day)  -PLEASE CONTACT THE OFFICE PRIOR TO STOPPING ANY MEDICATIONS  - Please allow 72 hours for all refill requests.  We will not refill any pain medicine after business hours or on weekends. Thank you!    Tens Unit:   A TENS unit uses low-voltage electrical current for pain relief. It is a small, battery-powered machine about the size of a pocket radio.  In short you place the patches on your skin over the painful area and turn on the stimulation.  You cannot overuse your TENS unit.    A good TENS unit for you to purchase is through [employer].  It is called the [employer] 10 Digital Tens Unit. Their phone number is: [phone].  It is $35 with $10 shipping.    It is also an option to  a TENS unit at your local pharmacy.  They range from $25 up.     It is recommended you schedule a follow-up appointment with Don Abreu MD, as needed.    Office hours are 8:00 am to 5:00 pm Monday through Friday. If it is urgent that you speak with someone outside of these hours, our [facility] Call Center will be able to assist you. You can reach the office by calling the [facility] at [phone] or [facility] at [phone].     We do highly recommend [facility], if you do not already have this. You can request access via the internet or by simply talking with a  at any of the clinics. Latasha    Thank you for choosing [facility] [place] as your Pain Management provider!    If you have any questions or concerns prior to your next office visit, please call our Pain Line: [phone].  They will take a message and send it to Dr Saxena's staff.  You will receive a response within 48 hours.      
Clear

## 2022-06-14 PROBLEM — Z00.00 ENCOUNTER FOR PREVENTIVE HEALTH EXAMINATION: Status: ACTIVE | Noted: 2022-06-14

## 2022-06-16 ENCOUNTER — APPOINTMENT (OUTPATIENT)
Dept: CARDIOLOGY | Facility: CLINIC | Age: 77
End: 2022-06-16
Payer: MEDICARE

## 2022-06-16 DIAGNOSIS — R10.13 EPIGASTRIC PAIN: ICD-10-CM

## 2022-06-16 PROCEDURE — 99203 OFFICE O/P NEW LOW 30 MIN: CPT

## 2022-06-16 PROCEDURE — 93000 ELECTROCARDIOGRAM COMPLETE: CPT

## 2022-06-17 VITALS
DIASTOLIC BLOOD PRESSURE: 83 MMHG | BODY MASS INDEX: 21.62 KG/M2 | OXYGEN SATURATION: 97 % | WEIGHT: 122 LBS | HEART RATE: 74 BPM | RESPIRATION RATE: 15 BRPM | HEIGHT: 63 IN | SYSTOLIC BLOOD PRESSURE: 127 MMHG

## 2022-06-17 PROBLEM — R10.13 DYSPEPSIA: Status: ACTIVE | Noted: 2022-06-17

## 2022-06-17 NOTE — REASON FOR VISIT
[CV Risk Factors and Non-Cardiac Disease] : CV risk factors and non-cardiac disease [Other: ____] : [unfilled] [FreeTextEntry3] : Dr. Mujica

## 2022-06-17 NOTE — PHYSICAL EXAM
[Well Developed] : well developed [Well Nourished] : well nourished [No Acute Distress] : no acute distress [Normal Conjunctiva] : normal conjunctiva [Normal Venous Pressure] : normal venous pressure [No Carotid Bruit] : no carotid bruit [Normal S1, S2] : normal S1, S2 [No Rub] : no rub [5th Left ICS - MCL] : palpated at the 5th LICS in the midclavicular line [Normal] : normal [No Precordial Heave] : no precordial heave was noted [Normal Rate] : normal [Rhythm Regular] : regular [Normal S1] : normal S1 [Normal S2] : normal S2 [No Gallop] : no gallop heard [II] : a grade 2 [No Pitting Edema] : no pitting edema present [2+] : left 2+ [No Abnormalities] : the abdominal aorta was not enlarged and no bruit was heard [Clear Lung Fields] : clear lung fields [Good Air Entry] : good air entry [No Respiratory Distress] : no respiratory distress  [Soft] : abdomen soft [Non Tender] : non-tender [No Masses/organomegaly] : no masses/organomegaly [Normal Bowel Sounds] : normal bowel sounds [Normal Gait] : normal gait [No Edema] : no edema [No Cyanosis] : no cyanosis [No Clubbing] : no clubbing [No Varicosities] : no varicosities [No Rash] : no rash [No Skin Lesions] : no skin lesions [Moves all extremities] : moves all extremities [No Focal Deficits] : no focal deficits [Normal Speech] : normal speech [Alert and Oriented] : alert and oriented [Normal memory] : normal memory [S3] : no S3 [S4] : no S4 [Right Carotid Bruit] : no bruit heard over the right carotid [Left Carotid Bruit] : no bruit heard over the left carotid [Right Femoral Bruit] : no bruit heard over the right femoral artery [Left Femoral Bruit] : no bruit heard over the left femoral artery

## 2022-06-17 NOTE — DISCUSSION/SUMMARY
[FreeTextEntry1] : This is a 76-year-old female with past medical history significant for GERD/dyspepsia, shortness of breath with walking up stairs, "abnormal electrocardiogram", who comes in for cardiac consultation.  The patient was complaining of shortness of breath while walking up stairs and talking.  She was evaluated for [facility] and sent home for outpatient follow-up and evaluation.\par She was recently seen by her primary care physician and told that she had an "abnormal electrocardiogram"\par She denies shortness of breath, dizziness, palpitations or syncope.  She has no history of rheumatic fever.  She does not drink excessive caffeine or alcohol.\par She has no known cardiac risk factors.\par Electrocardiogram done June 16, 2022 demonstrated normal sinus rhythm rate 72 bpm is otherwise remarkable for T wave inversion in V1 through V3.\par Blood work done April 24, 2022 demonstrated cholesterol 216, triglycerides 290, HDL of 41, LDL calculated 117 mg/dL and hemoglobin A1c of 5.3.\par The patient will schedule exercise stress test to rule out significant coronary artery disease.\par The patient will schedule echo Doppler examination to evaluate her murmur, left ventricular function, chamber size, and rule out hypertrophy.\par Patient's cholesterol is elevated, she wishes to change her diet to see if she can obtain improvement in the lipid profile.\par She will follow-up with me after above-noted diagnostic tests are completed.\par If you have any questions please do not hesitate to call.  Thank you for allow me to participate in the care of your patient.\par The patient understands that aerobic exercises must be increased to 40 minutes 4 times per week. A detailed discussion of lifestyle modification was done today. The patient has a good understanding of the diagnosis, and treatment plan. Lifestyle modification was also outlined.

## 2022-08-29 ENCOUNTER — APPOINTMENT (OUTPATIENT)
Dept: CARDIOLOGY | Facility: CLINIC | Age: 77
End: 2022-08-29

## 2022-08-29 ENCOUNTER — LABORATORY RESULT (OUTPATIENT)
Age: 77
End: 2022-08-29

## 2022-08-29 VITALS
RESPIRATION RATE: 14 BRPM | SYSTOLIC BLOOD PRESSURE: 126 MMHG | OXYGEN SATURATION: 98 % | BODY MASS INDEX: 21.79 KG/M2 | TEMPERATURE: 97.7 F | HEIGHT: 63 IN | WEIGHT: 123 LBS | HEART RATE: 78 BPM | DIASTOLIC BLOOD PRESSURE: 78 MMHG

## 2022-08-29 DIAGNOSIS — R94.39 ABNORMAL RESULT OF OTHER CARDIOVASCULAR FUNCTION STUDY: ICD-10-CM

## 2022-08-29 DIAGNOSIS — R07.89 OTHER CHEST PAIN: ICD-10-CM

## 2022-08-29 DIAGNOSIS — R06.09 OTHER FORMS OF DYSPNEA: ICD-10-CM

## 2022-08-29 PROCEDURE — 93015 CV STRESS TEST SUPVJ I&R: CPT

## 2022-08-29 PROCEDURE — 99214 OFFICE O/P EST MOD 30 MIN: CPT | Mod: 25

## 2022-08-29 PROCEDURE — 93306 TTE W/DOPPLER COMPLETE: CPT

## 2022-08-29 PROCEDURE — 93880 EXTRACRANIAL BILAT STUDY: CPT

## 2022-08-29 NOTE — DISCUSSION/SUMMARY
[FreeTextEntry1] : This is a 77-year-old female with past medical history significant for GERD/dyspepsia, history of thalassemia, shortness of breath with walking up stairs, "abnormal electrocardiogram", who comes in for cardiac follow-up evaluation.\par The patient had an abnormal exercise stress test August 29, 2022 consistent myocardial ischemia.  She had 1.5 mm ST changes in leads II, III, aVF and V4 to V6 associated with exertional chest pressure.\par The patient will start on aspirin 81 mg/day, Toprol-XL 25 mg/day and Crestor 20 mg/day.\par She will be scheduled for cardiac catheterization this week.\par I have explained to the patient and her  that if she had denies develop any chest pain or shortness of breath she must go to the emergency room immediately.\par Her cardiac risk factors include hypercholesterolemia.\par Electrocardiogram done June 16, 2022 demonstrated normal sinus rhythm rate 72 bpm is otherwise remarkable for T wave inversion in V1 through V3.\par Blood work done April 24, 2022 demonstrated cholesterol 216, triglycerides 290, HDL of 41, LDL calculated 117 mg/dL and hemoglobin A1c of 5.3.\par \par She will follow-up with me after above-noted diagnostic procedure is completed.\par The patient understands that aerobic exercises must be increased to 40 minutes 4 times per week. A detailed discussion of lifestyle modification was done today. The patient has a good understanding of the diagnosis, and treatment plan. Lifestyle modification was also outlined.

## 2022-08-29 NOTE — REASON FOR VISIT
[CV Risk Factors and Non-Cardiac Disease] : CV risk factors and non-cardiac disease [Other: ____] : [unfilled] [Hyperlipidemia] : hyperlipidemia [FreeTextEntry3] : Dr. Mujica

## 2022-08-30 ENCOUNTER — OUTPATIENT (OUTPATIENT)
Dept: OUTPATIENT SERVICES | Facility: HOSPITAL | Age: 77
LOS: 1 days | End: 2022-08-30

## 2022-08-30 DIAGNOSIS — Z11.52 ENCOUNTER FOR SCREENING FOR COVID-19: ICD-10-CM

## 2022-08-30 LAB — SARS-COV-2 RNA SPEC QL NAA+PROBE: SIGNIFICANT CHANGE UP

## 2022-09-02 ENCOUNTER — INPATIENT (INPATIENT)
Facility: HOSPITAL | Age: 77
LOS: 0 days | Discharge: HOME | DRG: 247 | End: 2022-09-03
Attending: INTERNAL MEDICINE | Admitting: INTERNAL MEDICINE
Payer: MEDICARE

## 2022-09-02 ENCOUNTER — TRANSCRIPTION ENCOUNTER (OUTPATIENT)
Age: 77
End: 2022-09-02

## 2022-09-02 VITALS
RESPIRATION RATE: 20 BRPM | HEART RATE: 50 BPM | TEMPERATURE: 98 F | SYSTOLIC BLOOD PRESSURE: 143 MMHG | DIASTOLIC BLOOD PRESSURE: 62 MMHG | OXYGEN SATURATION: 99 %

## 2022-09-02 DIAGNOSIS — R07.89 OTHER CHEST PAIN: ICD-10-CM

## 2022-09-02 LAB
ALBUMIN SERPL ELPH-MCNC: 4.6 G/DL — SIGNIFICANT CHANGE UP (ref 3.3–5)
ALP SERPL-CCNC: 114 U/L — SIGNIFICANT CHANGE UP (ref 40–120)
ALT FLD-CCNC: 16 U/L — SIGNIFICANT CHANGE UP (ref 10–45)
ANION GAP SERPL CALC-SCNC: 10 MMOL/L — SIGNIFICANT CHANGE UP (ref 5–17)
AST SERPL-CCNC: 23 U/L — SIGNIFICANT CHANGE UP (ref 10–40)
BILIRUB SERPL-MCNC: 0.4 MG/DL — SIGNIFICANT CHANGE UP (ref 0.2–1.2)
BUN SERPL-MCNC: 14 MG/DL — SIGNIFICANT CHANGE UP (ref 7–23)
CALCIUM SERPL-MCNC: 9.4 MG/DL — SIGNIFICANT CHANGE UP (ref 8.4–10.5)
CHLORIDE SERPL-SCNC: 102 MMOL/L — SIGNIFICANT CHANGE UP (ref 96–108)
CO2 SERPL-SCNC: 28 MMOL/L — SIGNIFICANT CHANGE UP (ref 22–31)
CREAT SERPL-MCNC: 0.64 MG/DL — SIGNIFICANT CHANGE UP (ref 0.5–1.3)
EGFR: 90 ML/MIN/1.73M2 — SIGNIFICANT CHANGE UP
GLUCOSE SERPL-MCNC: 93 MG/DL — SIGNIFICANT CHANGE UP (ref 70–99)
HCT VFR BLD CALC: 34.8 % — SIGNIFICANT CHANGE UP (ref 34.5–45)
HGB BLD-MCNC: 10.6 G/DL — LOW (ref 11.5–15.5)
MCHC RBC-ENTMCNC: 19.7 PG — LOW (ref 27–34)
MCHC RBC-ENTMCNC: 30.5 GM/DL — LOW (ref 32–36)
MCV RBC AUTO: 64.7 FL — LOW (ref 80–100)
NRBC # BLD: 0 /100 WBCS — SIGNIFICANT CHANGE UP (ref 0–0)
PLATELET # BLD AUTO: 219 K/UL — SIGNIFICANT CHANGE UP (ref 150–400)
POTASSIUM SERPL-MCNC: 4.3 MMOL/L — SIGNIFICANT CHANGE UP (ref 3.5–5.3)
POTASSIUM SERPL-SCNC: 4.3 MMOL/L — SIGNIFICANT CHANGE UP (ref 3.5–5.3)
PROT SERPL-MCNC: 8.1 G/DL — SIGNIFICANT CHANGE UP (ref 6–8.3)
RBC # BLD: 5.38 M/UL — HIGH (ref 3.8–5.2)
RBC # FLD: 16 % — HIGH (ref 10.3–14.5)
SODIUM SERPL-SCNC: 140 MMOL/L — SIGNIFICANT CHANGE UP (ref 135–145)
WBC # BLD: 4.19 K/UL — SIGNIFICANT CHANGE UP (ref 3.8–10.5)
WBC # FLD AUTO: 4.19 K/UL — SIGNIFICANT CHANGE UP (ref 3.8–10.5)

## 2022-09-02 PROCEDURE — 92928 PRQ TCAT PLMT NTRAC ST 1 LES: CPT | Mod: LD

## 2022-09-02 PROCEDURE — 93454 CORONARY ARTERY ANGIO S&I: CPT | Mod: 26,59

## 2022-09-02 PROCEDURE — 92921: CPT | Mod: LD

## 2022-09-02 PROCEDURE — 93010 ELECTROCARDIOGRAM REPORT: CPT

## 2022-09-02 PROCEDURE — 99152 MOD SED SAME PHYS/QHP 5/>YRS: CPT

## 2022-09-02 RX ORDER — ASPIRIN/CALCIUM CARB/MAGNESIUM 324 MG
81 TABLET ORAL DAILY
Refills: 0 | Status: DISCONTINUED | OUTPATIENT
Start: 2022-09-02 | End: 2022-09-03

## 2022-09-02 RX ORDER — ROSUVASTATIN CALCIUM 5 MG/1
1 TABLET ORAL
Qty: 0 | Refills: 0 | DISCHARGE

## 2022-09-02 RX ORDER — SODIUM CHLORIDE 9 MG/ML
250 INJECTION INTRAMUSCULAR; INTRAVENOUS; SUBCUTANEOUS ONCE
Refills: 0 | Status: COMPLETED | OUTPATIENT
Start: 2022-09-02 | End: 2022-09-02

## 2022-09-02 RX ORDER — ATORVASTATIN CALCIUM 80 MG/1
40 TABLET, FILM COATED ORAL AT BEDTIME
Refills: 0 | Status: DISCONTINUED | OUTPATIENT
Start: 2022-09-02 | End: 2022-09-03

## 2022-09-02 RX ORDER — ONDANSETRON 8 MG/1
4 TABLET, FILM COATED ORAL ONCE
Refills: 0 | Status: COMPLETED | OUTPATIENT
Start: 2022-09-02 | End: 2022-09-02

## 2022-09-02 RX ORDER — CLOPIDOGREL BISULFATE 75 MG/1
1 TABLET, FILM COATED ORAL
Qty: 90 | Refills: 3
Start: 2022-09-02 | End: 2023-08-27

## 2022-09-02 RX ORDER — PANTOPRAZOLE SODIUM 20 MG/1
40 TABLET, DELAYED RELEASE ORAL
Refills: 0 | Status: DISCONTINUED | OUTPATIENT
Start: 2022-09-02 | End: 2022-09-03

## 2022-09-02 RX ORDER — ATORVASTATIN CALCIUM 80 MG/1
1 TABLET, FILM COATED ORAL
Qty: 0 | Refills: 0 | DISCHARGE

## 2022-09-02 RX ORDER — METOPROLOL TARTRATE 50 MG
1 TABLET ORAL
Qty: 0 | Refills: 0 | DISCHARGE

## 2022-09-02 RX ORDER — METOPROLOL TARTRATE 50 MG
25 TABLET ORAL DAILY
Refills: 0 | Status: DISCONTINUED | OUTPATIENT
Start: 2022-09-02 | End: 2022-09-03

## 2022-09-02 RX ORDER — SODIUM CHLORIDE 9 MG/ML
100 INJECTION INTRAMUSCULAR; INTRAVENOUS; SUBCUTANEOUS
Refills: 0 | Status: COMPLETED | OUTPATIENT
Start: 2022-09-02 | End: 2022-09-02

## 2022-09-02 RX ORDER — ASPIRIN/CALCIUM CARB/MAGNESIUM 324 MG
1 TABLET ORAL
Qty: 0 | Refills: 0 | DISCHARGE

## 2022-09-02 RX ORDER — SODIUM CHLORIDE 9 MG/ML
1000 INJECTION INTRAMUSCULAR; INTRAVENOUS; SUBCUTANEOUS
Refills: 0 | Status: DISCONTINUED | OUTPATIENT
Start: 2022-09-02 | End: 2022-09-03

## 2022-09-02 RX ORDER — CLOPIDOGREL BISULFATE 75 MG/1
75 TABLET, FILM COATED ORAL DAILY
Refills: 0 | Status: DISCONTINUED | OUTPATIENT
Start: 2022-09-02 | End: 2022-09-03

## 2022-09-02 RX ORDER — FENTANYL CITRATE 50 UG/ML
25 INJECTION INTRAVENOUS ONCE
Refills: 0 | Status: DISCONTINUED | OUTPATIENT
Start: 2022-09-02 | End: 2022-09-02

## 2022-09-02 RX ADMIN — FENTANYL CITRATE 25 MICROGRAM(S): 50 INJECTION INTRAVENOUS at 18:05

## 2022-09-02 RX ADMIN — SODIUM CHLORIDE 75 MILLILITER(S): 9 INJECTION INTRAMUSCULAR; INTRAVENOUS; SUBCUTANEOUS at 13:44

## 2022-09-02 RX ADMIN — ONDANSETRON 4 MILLIGRAM(S): 8 TABLET, FILM COATED ORAL at 18:59

## 2022-09-02 RX ADMIN — ATORVASTATIN CALCIUM 40 MILLIGRAM(S): 80 TABLET, FILM COATED ORAL at 21:49

## 2022-09-02 RX ADMIN — FENTANYL CITRATE 25 MICROGRAM(S): 50 INJECTION INTRAVENOUS at 18:29

## 2022-09-02 NOTE — CHART NOTE - NSCHARTNOTEFT_GEN_A_CORE
Patient c/o CP 5/10 similar to pain she had in the cath lab but not severe . Pain substernal radiating to bilateral arms    EKG -no new ST changes     fentanyl 25 mcg x 1 given.  PATIENT  STATES SHE ALSO TOOK HER DEXILANT  because it feels like acid reflux.    - Kristin Crook  ext [phone]

## 2022-09-02 NOTE — DISCHARGE NOTE PROVIDER - NSDCCPCAREPLAN_GEN_ALL_CORE_FT
PRINCIPAL DISCHARGE DIAGNOSIS  Diagnosis: CAD (coronary artery disease)  Assessment and Plan of Treatment: You had a cardiac catheterization and stent placement to the Left anterior descending artery and balloon angioplasty to the Diagonal artery, It is imperative to continue the aspirin and plavix daily.      SECONDARY DISCHARGE DIAGNOSES  Diagnosis: HLD (hyperlipidemia)  Assessment and Plan of Treatment: *Low fat diet, exercise daily and continue current medications. Follow up with primary care physician and cardiologist for management.    Diagnosis: Benign essential HTN  Assessment and Plan of Treatment: *Low sodium and fat diet, continue anti-hypertensive medications, and follow up with primary care physician.

## 2022-09-02 NOTE — DISCHARGE NOTE PROVIDER - HOSPITAL COURSE
his is a 77 year old  female with PMH of hld, Thalassemia, and GERD. Seen and evaluated by Dr Mendez for c/c of shortness of breath with walking up stairs, "abnormal electrocardiogram".  The patient had an abnormal exercise stress test August 29, 2022 consistent myocardial ischemia. She had 1.5 mm ST changes in leads II, III, aVF and V4 to V6 associated with exertional chest pressure; was supposed to be started on aspirin 81 mg/day, Toprol-XL 25 mg/day and Crestor 20 mg/day after NST however there was a miscommunication and  pt never picked up medications from pharmacy.    She presented on 9/2 for cardiac cath and had a stent to the Left anterior descending artery (90%) and POBA to the diag.  RFA accessed .  Explained to the patient the importance of aspirin and plavix daily, she will continue the toprol and crestor which she already has at home but hasn't started taking yet .

## 2022-09-02 NOTE — DISCHARGE NOTE PROVIDER - NSDCMRMEDTOKEN_GEN_ALL_CORE_FT
Aspirin Enteric Coated 81 mg oral delayed release tablet: 1 tab(s) orally once a day  clopidogrel 75 mg oral tablet: 1 tab(s) orally once a day  Crestor 20 mg oral tablet: 1 tab(s) orally once a day  Toprol-XL 25 mg oral tablet, extended release: 1 tab(s) orally once a day

## 2022-09-02 NOTE — ASU PATIENT PROFILE, ADULT - FALL HARM RISK - UNIVERSAL INTERVENTIONS
Bed in lowest position, wheels locked, appropriate side rails in place/Call bell, personal items and telephone in reach/Instruct patient to call for assistance before getting out of bed or chair/Non-slip footwear when patient is out of bed/[place] to call system/Physically safe environment - no spills, clutter or unnecessary equipment/Purposeful Proactive Rounding/Room/bathroom lighting operational, light cord in reach

## 2022-09-02 NOTE — DISCHARGE NOTE PROVIDER - CARE PROVIDERS DIRECT ADDRESSES
[email or web address]

## 2022-09-02 NOTE — H&P CARDIOLOGY - NSICDXPASTMEDICALHX_GEN_ALL_CORE_FT
PAST MEDICAL HISTORY:  GERD (gastroesophageal reflux disease)     H/O thalassemia     HLD (hyperlipidemia)     HTN (hypertension)

## 2022-09-02 NOTE — PATIENT PROFILE ADULT - FALL HARM RISK - UNIVERSAL INTERVENTIONS
Bed in lowest position, wheels locked, appropriate side rails in place/Call bell, personal items and telephone in reach/Instruct patient to call for assistance before getting out of bed or chair/Non-slip footwear when patient is out of bed/[place] to call system/Physically safe environment - no spills, clutter or unnecessary equipment/Purposeful Proactive Rounding/Room/bathroom lighting operational, light cord in reach

## 2022-09-02 NOTE — DISCHARGE NOTE PROVIDER - NSDCFUADDINST_GEN_ALL_CORE_FT
Please see pre printed discharge instructions sheet .  Wound Care:   the day AFTER your procedure remove bandage GENTLY, and clean using  mild soap and gentle warm, water stream, pat dry. leave OPEN to air. YOU MAY SHOWER   DO NOT apply lotions, creams, ointments, powder, parfumes to your incision site  DO NOT SOAK your site for 1 week ( no baths, no pools, no tubs, etc...)  Check  your groin and /or wrist daily. A small amount of bruising, and soreness are normal    ACTIVITY: for 24 hours   - DO NOT DRIVE  - DO NOT make any important decisions or sign legal documents   - DO NOT operate heavy machineries   - you may resume sexual activity in 48 hours, unless otherwise instructed by your cardiologist     If your procedure was done through the WRIST: for the NEXT 3DAYS:  - avoid pushing, pulling, with that affected wrist   - avoid repeated movement of that hand and wrist ( eg: typing, hammering)  - DO NOT LIFT anything more than 5 lbs     If your procedure was done through the GROIN: for the NEXT 5 DAYS  - Limit climbing stairs, DO NOT soak in bathtub or pool  - no strenuous activities, pushing, pulling, straining  - Do not lift anything 10lbs or heavier     MEDICATION:   take your medications as explained ( see discharge paperwork)   If you received a STENT, you will be taking antiplatelet medications to KEEP YOUR STENT OPEN ( eg: Aspirin, Plavix, Brilinta, Effient, etc).  Take as prescribed DO NOT STOP taking them without consulting with your cardiologist first.     Follow heart healthy diet recommended by your doctor, , if you smoke STOP SMOKING ( may call [phone] for center of tobacco control if you need assistance)     CALL your doctor to make appointment in 2 WEEKS     ***CALL YOUR DOCTOR***  if you experience: fever, chills, body aches, or severe pain, swelling, redness, heat or yellow discharge at incision site  If you experience Bleeding or excruciating pain at the procedural site, swelling ( golf ball size) at your procedural site  If you experience CHEST PAIN  If you experience extremity numbness, tingling, temperature change ( of your procedural site)   If you are unable to reach your doctor, you may contact:   -Cardiology Office at [facility] at [phone] or   - [facility] [phone]  - [facility] [phone]

## 2022-09-02 NOTE — DISCHARGE NOTE PROVIDER - CARE PROVIDER_API CALL
Roni Mendez (MD)  Cardiology; Cardiovascular Disease; Internal Medicine  [address]  [place] [address]  Phone: [phone]  Fax: [phone]  Established Patient  Follow Up Time: 2 weeks

## 2022-09-02 NOTE — DISCHARGE NOTE PROVIDER - NSDCCPTREATMENT_GEN_ALL_CORE_FT
PRINCIPAL PROCEDURE  Procedure: Left heart cardiac catheterization  Findings and Treatment: You had a stent palced to the Left anterior descending artery and a balloon angioplasty to the diagonal artery. CONTINUE THE PLAVIX AND ASPIRIN DAILY.  Please follow up with Dr Roni Mendez in 2 weeks.

## 2022-09-02 NOTE — CHART NOTE - NSCHARTNOTEFT_GEN_A_CORE
Removal of Femoral Sheath by URSZULA Ramirez    Pulses in the (right) lower extremity are (palpabe). The patient was placed in the supine position. The insertion site was identified and the sutures were removed per protocol.  The ___5_ [place] femoral sheath was then removed. Direct pressure was applied for  ____20__ minutes.     Monitoring of the (right) groin and both lower extremities including neuro-vascular checks and vital signs every 15 minutes x 4, then every 30 minutes x 2, then every 1 hour x 2 and the every 4 hours was ordered.    Complications: No hematoma, no bleed    Comments:    Education: medication adherence: in particular DAPT adherence with rational, groin care, signs and symptoms of groin complications reviewed with pt who verbalizes understanding. All questions answered.         Tristian Rey, ARMAND  x [phone]

## 2022-09-02 NOTE — H&P CARDIOLOGY - HISTORY OF PRESENT ILLNESS
This is a 77 year old  female with PMH of hld, HTN, Thalassemia,  and GERD. Seen and evaluated by Dr Mendez for c/c of shortness of breath with walking up stairs, "abnormal electrocardiogram".  The patient had an abnormal exercise stress test August 29, 2022 consistent myocardial ischemia. She had 1.5 mm ST changes in leads II, III, aVF and V4 to V6 associated with exertional chest pressure; was started on aspirin 81 mg/day, Toprol-XL 25 mg/day and Crestor 20 mg/day.  Presents here today for [facility] for further evaluation of CAD.              This is a 77 year old  female with PMH of hld, Thalassemia, and GERD. Seen and evaluated by Dr Mendez for c/c of shortness of breath with walking up stairs, "abnormal electrocardiogram".  The patient had an abnormal exercise stress test August 29, 2022 consistent myocardial ischemia. She had 1.5 mm ST changes in leads II, III, aVF and V4 to V6 associated with exertional chest pressure; was supposed to be started on aspirin 81 mg/day, Toprol-XL 25 mg/day and Crestor 20 mg/day after NST however there was a miscommunication and  pt never picked up medications from pharmacy.    Presents here today for [facility] for further evaluation of CAD.

## 2022-09-03 ENCOUNTER — TRANSCRIPTION ENCOUNTER (OUTPATIENT)
Age: 77
End: 2022-09-03

## 2022-09-03 VITALS
OXYGEN SATURATION: 99 % | RESPIRATION RATE: 18 BRPM | TEMPERATURE: 98 F | HEART RATE: 68 BPM | SYSTOLIC BLOOD PRESSURE: 141 MMHG | DIASTOLIC BLOOD PRESSURE: 66 MMHG

## 2022-09-03 PROCEDURE — 85027 COMPLETE CBC AUTOMATED: CPT

## 2022-09-03 PROCEDURE — U0005: CPT

## 2022-09-03 PROCEDURE — C1894: CPT

## 2022-09-03 PROCEDURE — 92921: CPT | Mod: LD

## 2022-09-03 PROCEDURE — 80053 COMPREHEN METABOLIC PANEL: CPT

## 2022-09-03 PROCEDURE — 93454 CORONARY ARTERY ANGIO S&I: CPT | Mod: 59

## 2022-09-03 PROCEDURE — U0003: CPT

## 2022-09-03 PROCEDURE — C1874: CPT

## 2022-09-03 PROCEDURE — 93005 ELECTROCARDIOGRAM TRACING: CPT

## 2022-09-03 PROCEDURE — C1725: CPT

## 2022-09-03 PROCEDURE — C1887: CPT

## 2022-09-03 PROCEDURE — C9803: CPT

## 2022-09-03 PROCEDURE — C9600: CPT

## 2022-09-03 PROCEDURE — C1769: CPT

## 2022-09-03 PROCEDURE — C9607: CPT

## 2022-09-03 RX ADMIN — Medication 81 MILLIGRAM(S): at 05:17

## 2022-09-03 RX ADMIN — Medication 25 MILLIGRAM(S): at 05:17

## 2022-09-03 RX ADMIN — PANTOPRAZOLE SODIUM 40 MILLIGRAM(S): 20 TABLET, DELAYED RELEASE ORAL at 05:17

## 2022-09-03 RX ADMIN — CLOPIDOGREL BISULFATE 75 MILLIGRAM(S): 75 TABLET, FILM COATED ORAL at 05:17

## 2022-09-03 NOTE — DISCHARGE NOTE NURSING/CASE MANAGEMENT/SOCIAL WORK - PATIENT PORTAL LINK FT
You can access the FollowMyHealth Patient Portal offered by [facility] by registering at the following website: [email or web address]. By joining [facility]’s FollowMyHealth portal, you will also be able to view your health information using other applications (apps) compatible with our system.

## 2022-09-03 NOTE — PROGRESS NOTE ADULT - ASSESSMENT
This is a 77 year old  female with PMH of hld, Thalassemia, and GERD. Seen and evaluated by Dr Mendez for c/c of shortness of breath with walking up stairs, "abnormal electrocardiogram".  The patient had an abnormal exercise stress test August 29, 2022 consistent myocardial ischemia. She had 1.5 mm ST changes in leads II, III, aVF and V4 to V6 associated with exertional chest pressure; was supposed to be started on aspirin 81 mg/day, Toprol-XL 25 mg/day and Crestor 20 mg/day after NST however there was a miscommunication and  pt never picked up medications from pharmacy.    Presents here today for [facility] for further evaluation of CAD.           s/p cardiac cath on 9/2 via RFA with 1 stent to the mid LAD and balloon of D1        right groin w/o bleeding or hematoma.  soft, non tender.  Pulses in the right lower extremity are palpable.  Denies chest pain, denies groin/leg/foot: pain, numbness or tingling       - Reviewed and reinforced with patient:  wound care instructions, activities dos and donts, medication compliance specifically antiplatelet therapy given stent/s.    - Patient aware to take DAPT  as prescribed and DO NOT STOP taking without consulting cardiologist first or STENT/s WILL CLOSE  - Reviewed and reinforced with patient:  site complications ( eg: bleeding, excruciating pain at the procedural site, large swelling-golf ball size-  extremity numbness, tingling, temperature change), or CHEST PAIN; pt aware that if any of those occur he/she must call cardiologist IMMEDIATELY or 911 or go to nearest emergency room   - Reviewed and reinforced a heart healthy diet, Smoking Cessation  - Patient verbalizes understanding of ALL OF THE ABOVE, and gives positive feedback     #CAD  cont DAPT aspirin enteric coated 81 milliGRAM(s) Oral daily & clopidogrel Tablet 75 milliGRAM(s) Oral daily  continue statin   Keep Mg >2 K >4  f/u appt in 2 weeks post dc with oupt cardiologist  for all  general cardiology questions please contact patient's primary cards team   all other care as per primary medicine  team       #HTN  cont antihypertensives metoprolol succinate ER 25 milliGRAM(s) Oral daily  cont statin                        This is a 77 year old  female with PMH of hld, Thalassemia, and GERD. Seen and evaluated by Dr Mendez for c/c of shortness of breath with walking up stairs, "abnormal electrocardiogram".  The patient had an abnormal exercise stress test August 29, 2022 consistent myocardial ischemia. She had 1.5 mm ST changes in leads II, III, aVF and V4 to V6 associated with exertional chest pressure; was supposed to be started on aspirin 81 mg/day, Toprol-XL 25 mg/day and Crestor 20 mg/day after NST however there was a miscommunication and  pt never picked up medications from pharmacy.    Presents here today for [facility] for further evaluation of CAD.     s/p cardiac cath on 9/2 via RFA with 1 stent to the mid LAD and balloon of D1        right groin w/o bleeding or hematoma.  soft, non tender.  Pulses in the right lower extremity are palpable.  Denies chest pain, denies groin/leg/foot: pain, numbness or tingling       - Reviewed and reinforced with patient:  wound care instructions, activities dos and donts, medication compliance specifically antiplatelet therapy given stent/s.    - Patient aware to take DAPT  as prescribed and DO NOT STOP taking without consulting cardiologist first or STENT/s WILL CLOSE  - Reviewed and reinforced with patient:  site complications ( eg: bleeding, excruciating pain at the procedural site, large swelling-golf ball size-  extremity numbness, tingling, temperature change), or CHEST PAIN; pt aware that if any of those occur he/she must call cardiologist IMMEDIATELY or 911 or go to nearest emergency room   - Reviewed and reinforced a heart healthy diet, Smoking Cessation  - Patient verbalizes understanding of ALL OF THE ABOVE, and gives positive feedback     #CAD  cont DAPT aspirin enteric coated 81 milliGRAM(s) Oral daily & clopidogrel Tablet 75 milliGRAM(s) Oral daily  continue statin   Keep Mg >2 K >4  f/u appt in 2 weeks post dc with oupt cardiologist  for all  general cardiology questions please contact patient's primary cards team   all other care as per primary medicine  team       #HTN  cont antihypertensives metoprolol succinate ER 25 milliGRAM(s) Oral daily  cont statin   eat a heart healthy diet with low salt diet;   exercise regularly (consult with your physician or cardiologist first)  maintain a heart healthy weight                     This is a 77 year old  female with PMH of hld, Thalassemia, and GERD. Seen and evaluated by Dr Mendez for c/c of shortness of breath with walking up stairs, "abnormal electrocardiogram".  The patient had an abnormal exercise stress test August 29, 2022 consistent myocardial ischemia. She had 1.5 mm ST changes in leads II, III, aVF and V4 to V6 associated with exertional chest pressure; was supposed to be started on aspirin 81 mg/day, Toprol-XL 25 mg/day and Crestor 20 mg/day after NST however there was a miscommunication and  pt never picked up medications from pharmacy.    Presents here today for [facility] for further evaluation of CAD.     s/p cardiac cath on 9/2 via RFA with 1 stent to the mid LAD and balloon of D1        right groin w/o bleeding or hematoma.  soft, non tender.  Pulses in the right lower extremity are palpable.  Denies chest pain, denies groin/leg/foot: pain, numbness or tingling       - Reviewed and reinforced with patient:  wound care instructions, activities dos and donts, medication compliance specifically antiplatelet therapy given stent/s.    - Patient aware to take DAPT  as prescribed and DO NOT STOP taking without consulting cardiologist first or STENT/s WILL CLOSE  - Reviewed and reinforced with patient:  site complications ( eg: bleeding, excruciating pain at the procedural site, large swelling-golf ball size-  extremity numbness, tingling, temperature change), or CHEST PAIN; pt aware that if any of those occur he/she must call cardiologist IMMEDIATELY or 911 or go to nearest emergency room   - Reviewed and reinforced a heart healthy diet, Smoking Cessation  - Patient verbalizes understanding of ALL OF THE ABOVE, and gives positive feedback     #CAD  cont DAPT aspirin enteric coated 81 milliGRAM(s) Oral daily & clopidogrel Tablet 75 milliGRAM(s) Oral daily  continue statin   Keep Mg >2 K >4  f/u appt in 2 weeks post dc with oupt cardiologist  for all  general cardiology questions please contact patient's primary cards team   all other care as per primary medicine  team       #HTN  cont antihypertensives metoprolol succinate ER 25 milliGRAM(s) Oral daily  eat a heart healthy diet with low salt diet;   exercise regularly (consult with your physician or cardiologist first)  maintain a heart healthy weight    #HLD  lipid profile  DASH diet  statin       URSZULA Triana  Interventional Cardiology

## 2022-09-03 NOTE — DISCHARGE NOTE NURSING/CASE MANAGEMENT/SOCIAL WORK - NSDCPEFALRISK_GEN_ALL_CORE
For information on Fall & Injury Prevention, visit: [email or web address] OR  [email or web address] OR  https://www.cdc.gov/steadi/patient.html

## 2022-09-03 NOTE — PROGRESS NOTE ADULT - SUBJECTIVE AND OBJECTIVE BOX
[facility] INVASIVE CARDIOLOGY- (Meseret, Viktoria, Shi, Zayda, Ant, Donn, Vic, Guillaume, Moe)   CARDIAC CATH LAB, [facility] TEAM   [phone]      CHIEF COMPLAINT: Patient is a 77y old  Female who presents with a chief complaint of elective cardiac catheterization after abnormal stress test  (02 Sep 2022 13:22)      HPI: This is a 77 year old  female with PMH of hld, Thalassemia, and GERD. Seen and evaluated by Dr Mendez for c/c of shortness of breath with walking up stairs, "abnormal electrocardiogram".  The patient had an abnormal exercise stress test August 29, 2022 consistent myocardial ischemia. She had 1.5 mm ST changes in leads II, III, aVF and V4 to V6 associated with exertional chest pressure; was supposed to be started on aspirin 81 mg/day, Toprol-XL 25 mg/day and Crestor 20 mg/day after NST however there was a miscommunication and  pt never picked up medications from pharmacy.    Presents here today for [facility] for further evaluation of CAD.           Subjective/Observations: patient seen and examined.  denies chest pain, dyspena, dizziness, palpitations, N&V, HA      Review of Systems all WNL except below indicated:    Constitutional: [ ] Fever [ ] Chills [ ] Fatigue [ ] Weight change   HEENT: [ ] Blurred vision [ ] Eye Pain [ ] Headache [ ] Runny nose [ ] Sore Throat   Respiratory: [ ] Cough [ ] Wheezing [ ] Shortness of breath  Cardiovascular: [ ] Chest Pain [ ] Palpitations [ ] NASH [ ] PND [ ] Orthopnea  Gastrointestinal: [ ] Abdominal Pain [ ] Diarrhea [ ] Constipation [ ] Hemorrhoids [ ] Nausea [ ] Vomiting  Genitourinary: [ ] Nocturia [ ] Dysuria [ ] Incontinence  Extremities: [ ] Swelling [ ] Joint Pain  Neurologic: [ ] Focal deficit [ ] Paresthesias [ ] Syncope  Lymphatic: [ ] Swelling [ ] Lymphadenopathy   Skin: [ ] Rash [ ] Ecchymoses [ ] Wounds [ ] Lesions  Psychiatry: [ ] Depression [ ] Suicidal/Homicidal Ideation [ ] Anxiety [ ] Sleep Disturbances  [x] 10 point review of systems is otherwise negative except as mentioned above            [ ]Unable to obtain    PAST MEDICAL & SURGICAL HISTORY:  HLD (hyperlipidemia)      GERD (gastroesophageal reflux disease)      HTN (hypertension)      H/O thalassemia      No significant past surgical history          MEDICATIONS  (STANDING):  aspirin enteric coated 81 milliGRAM(s) Oral daily  atorvastatin 40 milliGRAM(s) Oral at bedtime  clopidogrel Tablet 75 milliGRAM(s) Oral daily  metoprolol succinate ER 25 milliGRAM(s) Oral daily  pantoprazole    Tablet 40 milliGRAM(s) Oral before breakfast  sodium chloride 0.9%. 1000 milliLiter(s) (75 mL/Hr) IV Continuous <Continuous>    MEDICATIONS  (PRN):  aluminum hydroxide/magnesium hydroxide/simethicone Suspension 30 milliLiter(s) Oral every 4 hours PRN Dyspepsia      Allergies    No Known Allergies    Intolerances          Vital Signs Last 24 Hrs  T(C): 36.5 (02 Sep 2022 12:45), Max: 36.5 (02 Sep 2022 12:35)  T(F): 97.7 (02 Sep 2022 12:45), Max: 97.7 (02 Sep 2022 12:35)  HR: 59 (02 Sep 2022 19:30) (50 - 73)  BP: 150/60 (02 Sep 2022 18:30) (116/55 - 164/80)  BP(mean): 89 (02 Sep 2022 14:30) (86 - 95)  RR: 18 (02 Sep 2022 19:30) (12 - 22)  SpO2: 98% (02 Sep 2022 19:30) (98% - 100%)    Parameters below as of 02 Sep 2022 19:30  Patient On (Oxygen Delivery Method): room air        I&O's Summary        FOCUSED PHYSICAL EXAM:  Pulmonary: Non-labored, breath sounds are clear bilaterally, No wheezing, rales or rhonchi  Cardiovascular: Regular, S1 and S2, No murmurs, rubs, gallops or clicks  cath site: right groin stable w/o bleeding or hematoma, soft, + pulses     LABS: All Labs Reviewed:                        10.6   4.19  )-----------( 219      ( 02 Sep 2022 09:36 )             34.8     02 Sep 2022 09:36    140    |  102    |  14     ----------------------------<  93     4.3     |  28     |  0.64     Ca    9.4        02 Sep 2022 09:36    TPro  8.1    /  Alb  4.6    /  TBili  0.4    /  DBili  x      /  AST  23     /  ALT  16     /  AlkPhos  114    02 Sep 2022 09:36              RESULTS:  TELE intepretation:   ECG: sinus bradycardia at 53 bpm, ST & T wave abnormalities   CATH REPORT: 9/2   Conclusions:   Successful stent of mid LAD and balloon of D1.         [facility] INVASIVE CARDIOLOGY- (Meseret, Viktoria, Shi, Zayda, Ant, Donn, Vic, Guillaume, Moe)   CARDIAC CATH LAB, [facility] TEAM   [phone]      CHIEF COMPLAINT: Patient is a 77y old  Female who presents with a chief complaint of elective cardiac catheterization after abnormal stress test  (02 Sep 2022 13:22)      HPI: This is a 77 year old  female with PMH of hld, Thalassemia, and GERD. Seen and evaluated by Dr Mendez for c/c of shortness of breath with walking up stairs, "abnormal electrocardiogram".  The patient had an abnormal exercise stress test August 29, 2022 consistent myocardial ischemia. She had 1.5 mm ST changes in leads II, III, aVF and V4 to V6 associated with exertional chest pressure; was supposed to be started on aspirin 81 mg/day, Toprol-XL 25 mg/day and Crestor 20 mg/day after NST however there was a miscommunication and  pt never picked up medications from pharmacy.    Presents here today for [facility] for further evaluation of CAD.           Subjective/Observations: patient seen and examined.  denies chest pain, dyspena, dizziness, palpitations, N&V, HA      Review of Systems all WNL except below indicated:    Constitutional: [ ] Fever [ ] Chills [ ] Fatigue [ ] Weight change   HEENT: [ ] Blurred vision [ ] Eye Pain [ ] Headache [ ] Runny nose [ ] Sore Throat   Respiratory: [ ] Cough [ ] Wheezing [ ] Shortness of breath  Cardiovascular: [ ] Chest Pain [ ] Palpitations [ ] NASH [ ] PND [ ] Orthopnea  Gastrointestinal: [ ] Abdominal Pain [ ] Diarrhea [ ] Constipation [ ] Hemorrhoids [ ] Nausea [ ] Vomiting  Genitourinary: [ ] Nocturia [ ] Dysuria [ ] Incontinence  Extremities: [ ] Swelling [ ] Joint Pain  Neurologic: [ ] Focal deficit [ ] Paresthesias [ ] Syncope  Lymphatic: [ ] Swelling [ ] Lymphadenopathy   Skin: [ ] Rash [ ] Ecchymoses [ ] Wounds [ ] Lesions  Psychiatry: [ ] Depression [ ] Suicidal/Homicidal Ideation [ ] Anxiety [ ] Sleep Disturbances  [x] 10 point review of systems is otherwise negative except as mentioned above            [ ]Unable to obtain    PAST MEDICAL & SURGICAL HISTORY:  HLD (hyperlipidemia)      GERD (gastroesophageal reflux disease)      HTN (hypertension)      H/O thalassemia      No significant past surgical history          MEDICATIONS  (STANDING):  aspirin enteric coated 81 milliGRAM(s) Oral daily  atorvastatin 40 milliGRAM(s) Oral at bedtime  clopidogrel Tablet 75 milliGRAM(s) Oral daily  metoprolol succinate ER 25 milliGRAM(s) Oral daily  pantoprazole    Tablet 40 milliGRAM(s) Oral before breakfast  sodium chloride 0.9%. 1000 milliLiter(s) (75 mL/Hr) IV Continuous <Continuous>    MEDICATIONS  (PRN):  aluminum hydroxide/magnesium hydroxide/simethicone Suspension 30 milliLiter(s) Oral every 4 hours PRN Dyspepsia      Allergies    No Known Allergies    Intolerances          Vital Signs Last 24 Hrs  T(C): 36.5 (02 Sep 2022 12:45), Max: 36.5 (02 Sep 2022 12:35)  T(F): 97.7 (02 Sep 2022 12:45), Max: 97.7 (02 Sep 2022 12:35)  HR: 59 (02 Sep 2022 19:30) (50 - 73)  BP: 150/60 (02 Sep 2022 18:30) (116/55 - 164/80)  BP(mean): 89 (02 Sep 2022 14:30) (86 - 95)  RR: 18 (02 Sep 2022 19:30) (12 - 22)  SpO2: 98% (02 Sep 2022 19:30) (98% - 100%)    Parameters below as of 02 Sep 2022 19:30  Patient On (Oxygen Delivery Method): room air        I&O's Summary        FOCUSED PHYSICAL EXAM:  Pulmonary: Non-labored, breath sounds are clear bilaterally, No wheezing, rales or rhonchi  Cardiovascular: Regular, S1 and S2, No murmurs, rubs, gallops or clicks  cath site: right groin stable w/o bleeding or hematoma, soft, + pulses     LABS: All Labs Reviewed:                        10.6   4.19  )-----------( 219      ( 02 Sep 2022 09:36 )             34.8     02 Sep 2022 09:36    140    |  102    |  14     ----------------------------<  93     4.3     |  28     |  0.64     Ca    9.4        02 Sep 2022 09:36    TPro  8.1    /  Alb  4.6    /  TBili  0.4    /  DBili  x      /  AST  23     /  ALT  16     /  AlkPhos  114    02 Sep 2022 09:36              RESULTS:  TELE interpretation no events to report   ECG: sinus bradycardia at 53 bpm, ST & T wave abnormalities   CATH REPORT: 9/2   Conclusions:   Successful stent of mid LAD and balloon of D1.

## 2022-09-04 ENCOUNTER — TRANSCRIPTION ENCOUNTER (OUTPATIENT)
Age: 77
End: 2022-09-04

## 2022-09-08 PROBLEM — Z86.2 PERSONAL HISTORY OF DISEASES OF THE BLOOD AND BLOOD-FORMING ORGANS AND CERTAIN DISORDERS INVOLVING THE IMMUNE MECHANISM: Chronic | Status: ACTIVE | Noted: 2022-09-02

## 2022-09-08 PROBLEM — I10 ESSENTIAL (PRIMARY) HYPERTENSION: Chronic | Status: ACTIVE | Noted: 2022-09-02

## 2022-09-09 ENCOUNTER — NON-APPOINTMENT (OUTPATIENT)
Age: 77
End: 2022-09-09

## 2022-09-09 ENCOUNTER — APPOINTMENT (OUTPATIENT)
Dept: CARDIOLOGY | Facility: CLINIC | Age: 77
End: 2022-09-09

## 2022-09-09 VITALS
RESPIRATION RATE: 16 BRPM | TEMPERATURE: 97.3 F | HEART RATE: 71 BPM | SYSTOLIC BLOOD PRESSURE: 110 MMHG | BODY MASS INDEX: 21.79 KG/M2 | WEIGHT: 123 LBS | DIASTOLIC BLOOD PRESSURE: 70 MMHG | OXYGEN SATURATION: 100 % | HEIGHT: 63 IN

## 2022-09-09 PROCEDURE — 99214 OFFICE O/P EST MOD 30 MIN: CPT | Mod: 25

## 2022-09-09 PROCEDURE — 93000 ELECTROCARDIOGRAM COMPLETE: CPT

## 2022-09-09 RX ORDER — CLOPIDOGREL BISULFATE 300 MG/1
TABLET, FILM COATED ORAL
Refills: 0 | Status: DISCONTINUED | COMMUNITY

## 2022-09-09 RX ORDER — ROSUVASTATIN CALCIUM 20 MG/1
20 TABLET, FILM COATED ORAL
Qty: 90 | Refills: 1 | Status: COMPLETED | COMMUNITY
Start: 2022-08-29 | End: 2022-09-09

## 2022-09-09 NOTE — REASON FOR VISIT
[CV Risk Factors and Non-Cardiac Disease] : CV risk factors and non-cardiac disease [Hyperlipidemia] : hyperlipidemia [Other: ____] : [unfilled] [FreeTextEntry3] : Dr. Mujica [FreeTextEntry1] : This is a 77 year year old female here today for follow up cardiac evaluation. \par She has a past medical history significant for GERD/dyspepsia, history of thalassemia, shortness of breath with walking up stairs, "abnormal electrocardiogram", CAD and s/p stent on 9/2/22.\par \par CHIEF COMPLAINT:\par Today she is feeling generally well and does not have any complaints at this time.  She is here status post cardiac catheterization done September 2, 2022.  She received a successful stent of mid LAD and balloon of D1 artery.  There was a 95% stenosis in the mid left anterior descending and 100% stenosis in the first diagonal artery.  She is now currently on aspirin 81 mg daily and Plavix 75 mg daily for dual antiplatelet therapy.\par \par She remains on metoprolol succinate 25 mg daily and stated she was placed on rosuvastatin 20 mg daily for cholesterol management however had a severe allergic reaction to this medication and stopped it.  She states that she was on Lipitor in the past with no reaction.  While on rosuvastatin she stated that she was getting severe chest pains, back pain with difficulty breathing.\par \par At this current time she is feeling well she does have occasional dyspnea on exertion but does not know if this may be anxiety related.  She does not have a pulmonologist at this time.\par \par She denies fever, chills, weight loss, malaise, rash, alteration bowel habits, weakness, abdominal  pain, bloating, changes in urination, visual disturbances, chest pain, headaches, dizziness, heart palpitations, recent episodes of syncope or falls at this time.\par \par

## 2022-09-09 NOTE — ASSESSMENT
[FreeTextEntry1] : This is a 77 year year old female here today for follow up cardiac evaluation. \par She has a past medical history significant for GERD/dyspepsia, history of thalassemia, shortness of breath with walking up stairs, "abnormal electrocardiogram", CAD and s/p stent on 9/2/22.\par \par CHIEF COMPLAINT:\par Today she is feeling generally well and does not have any complaints at this time.  She is here status post cardiac catheterization done September 2, 2022.  She received a successful stent of mid LAD and balloon of D1 artery.  There was a 95% stenosis in the mid left anterior descending and 100% stenosis in the first diagonal artery.  She is now currently on aspirin 81 mg daily and Plavix 75 mg daily for dual antiplatelet therapy.\par \par She remains on metoprolol succinate 25 mg daily and stated she was placed on rosuvastatin 20 mg daily for cholesterol management however had a severe allergic reaction to this medication and stopped it.  She states that she was on Lipitor in the past with no reaction.  While on rosuvastatin she stated that she was getting severe chest pains, back pain with difficulty breathing.\par \par At this current time she is feeling well she does have occasional dyspnea on exertion but does not know if this may be anxiety related.  She does not have a pulmonologist at this time.\par \par BLOOD PRESSURE:\par -BP is well controlled in today's visit.\par \par -I have discussed the importance of maintaining good BP control and reviewed the newest guidelines with the patient while re-enforcing dietary sodium restrictions to no more than 2-3 g daily, DASH diet, life style modifications as well as the goal of maintaining ideal body weight with the patient today. I have advised the patient to avoid the use of over-the-counter medications/ supplements especially NSAIDS.\par \par I have reviewed with MsJyothi MICHAEL that serious health consequences can occur when blood pressure is not well controlled and the need for strict compliance with medication and that optimal control can significantly reduce the risk of cardiovascular disease stroke, heart attack and other organ damage. They verbally expressed understanding of the fore mentioned serious health consequences to me today.\par \par BLOOD WORK:\par -New blood work was done August 29, 2022 which demonstrated direct .  Her LDL goal needs to be 70 or less due to her coronary artery disease and status post cardiac stent.\par \par CHOLESTEROL CONTROL:\par -Patient will continue the advised  TLC diet and to continue follow-up for treatment of hyperlipidemia and repeat blood testing with diet and exercise. I have discussed different exercises and the importance of maintenance of optimal body weight. The importance of staying within guidelines and recommendations was stressed to the patient today and they acknowledged that they understand this to me verbally.\par \par  -Ms. RODRIGUEZ was educated and advised that failure to follow-up with my medical recommendations to lower cholesterol can result in severe health consequences therefore, they will continuing a low saturated and low fat diet and to avoid excessive carbohydrates to help reduce triglycerides and that lowering LDL levels is associated with a significant decrease in serious cardiac events including but not limited to heart attack stroke and overall death. We will continue lipid lowering agents as advised based on blood test results and the patient understands to call if they develop severe muscle discomfort or if they have a reddish tinted discoloration in their urine.\par \par CAD:\par The patient is currently stable and they are compliant taking their antiplatelet medications as well as lipid-lowering agents. At this time, there is no definite subjective or objective evidence of active, uncontrolled coronary ischemia, congestive heart failure, or ventricular arrhythmia. The patient remains on therapy for hyperlipidemia and LDL goal is 70 or less.  The patient was educated on signs and symptoms for worsening coronary artery disease such as new onset chest pain and/or dyspnea on exertion.  They were educated on their antiplatelet medications/risk for bleeding and it was re-enforced that they should make us aware if any spontaneous bleeding were to occur.The importance of complying with prior medical recommendations for prevention of further cardiac events was stressed to the patient today as well as maintaining healthy diet, increasing exercise and ideal body weight\par \par TESTING/REPORTS:\par -EKG done Sep 09, 2022 which demonstrated regular sinus rhythm with nonspecific ST-T wave changes, BPM of 71.\par \par -Cardiac catheterization done September 2, 2022.  She received a successful stent of mid LAD and balloon of D1 artery.  There was a 95% stenosis in the mid left anterior descending and 100% stenosis in the first diagonal artery.  She is now currently on aspirin 81 mg daily and Plavix 75 mg daily for dual antiplatelet therapy.\par \par -Echocardiogram done August 29, 2022 demonstrated mild mitral, tricuspid, aortic regurgitation with minimal pulmonic regurgitation.  Borderline pulmonary pressures with normal left ventricular systolic function ejection fraction of 65%.\par \par -The patient had an abnormal exercise stress test August 29, 2022 consistent myocardial ischemia.  She had 1.5 mm ST changes in leads II, III, aVF and V4 to V6 associated with exertional chest pressure.\par \par -Electrocardiogram done June 16, 2022 demonstrated normal sinus rhythm rate 72 bpm is otherwise remarkable for T wave inversion in V1 through V3.\par \par PLAN:\par -The patient will remain off rosuvastatin for now and will start atorvastatin 20 mg daily.\par -We will repeat her blood work during her next follow-up appointment on the atorvastatin 20 mg dose to reevaluate her LDL level.  If needed we may increase her dose to 40 mg or possibly add Zetia 10 mg daily for an LDL goal of 70 or less.\par -She will continue with her her current medications and will contact the office if she is having any complaints between now and their next follow up appointment.\par -She will follow-up with her pulmonary to further evaluate her dyspnea on exertion.  At this time she is clinically stable and does not appear in distress on exam.\par \par I have discussed the plan of care with Ms. WALI RODRIGUEZ  and she  will follow up in 1 month. She is compliant with all of her medications.\par \par The patient understands that aerobic exercises must be increased to minutes 4 times/week and a detailed discussion of lifestyle modification was done today. \par The patient has a good understanding of the diagnosis, treatment plan and lifestyle modification. \par She will contact me at the office for any questions with their care or any changes in their health status.\par \par \par Nelly ACUNA

## 2022-09-09 NOTE — DISCUSSION/SUMMARY
[FreeTextEntry1] : Dr. Mendez-(PRIOR VISIT and PMH WITH Dr. Mendez): \par This is a 77-year-old female with past medical history significant for GERD/dyspepsia, history of thalassemia, shortness of breath with walking up stairs, "abnormal electrocardiogram", who comes in for cardiac follow-up evaluation.\par \par The patient had an abnormal exercise stress test August 29, 2022 consistent myocardial ischemia.  She had 1.5 mm ST changes in leads II, III, aVF and V4 to V6 associated with exertional chest pressure.\par \par The patient will start on aspirin 81 mg/day, Toprol-XL 25 mg/day and Crestor 20 mg/day.\par She will be scheduled for cardiac catheterization this week.\par I have explained to the patient and her  that if she had denies develop any chest pain or shortness of breath she must go to the emergency room immediately.\par \par Her cardiac risk factors include hypercholesterolemia.\par \par Electrocardiogram done June 16, 2022 demonstrated normal sinus rhythm rate 72 bpm is otherwise remarkable for T wave inversion in V1 through V3.\par \par Blood work done April 24, 2022 demonstrated cholesterol 216, triglycerides 290, HDL of 41, LDL calculated 117 mg/dL and hemoglobin A1c of 5.3.\par \par She will follow-up with me after above-noted diagnostic procedure is completed.\par \par The patient understands that aerobic exercises must be increased to 40 minutes 4 times per week. A detailed discussion of lifestyle modification was done today. The patient has a good understanding of the diagnosis, and treatment plan. Lifestyle modification was also outlined.

## 2022-09-18 PROBLEM — R06.09 DYSPNEA ON EXERTION: Status: ACTIVE | Noted: 2022-06-17

## 2022-09-23 ENCOUNTER — NON-APPOINTMENT (OUTPATIENT)
Age: 77
End: 2022-09-23

## 2022-09-26 ENCOUNTER — LABORATORY RESULT (OUTPATIENT)
Age: 77
End: 2022-09-26

## 2022-09-26 ENCOUNTER — APPOINTMENT (OUTPATIENT)
Dept: CARDIOLOGY | Facility: CLINIC | Age: 77
End: 2022-09-26

## 2022-09-26 VITALS
HEART RATE: 56 BPM | TEMPERATURE: 97.5 F | OXYGEN SATURATION: 100 % | RESPIRATION RATE: 16 BRPM | BODY MASS INDEX: 21.79 KG/M2 | HEIGHT: 63 IN | DIASTOLIC BLOOD PRESSURE: 72 MMHG | SYSTOLIC BLOOD PRESSURE: 110 MMHG | WEIGHT: 123 LBS

## 2022-09-26 PROCEDURE — 99214 OFFICE O/P EST MOD 30 MIN: CPT | Mod: 25

## 2022-09-26 RX ORDER — DEXLANSOPRAZOLE 60 MG/1
60 CAPSULE, DELAYED RELEASE ORAL DAILY
Refills: 0 | Status: COMPLETED | COMMUNITY
Start: 2022-06-20 | End: 2022-09-26

## 2022-09-26 NOTE — REASON FOR VISIT
[CV Risk Factors and Non-Cardiac Disease] : CV risk factors and non-cardiac disease [Hyperlipidemia] : hyperlipidemia [Other: ____] : [unfilled] [FreeTextEntry3] : Dr. Mujica [FreeTextEntry1] : This is a 77 year year old female here today for follow up cardiac evaluation. \par She has a past medical history significant for GERD/dyspepsia, history of thalassemia, shortness of breath with walking up stairs, "abnormal electrocardiogram", CAD and s/p stent on 9/2/22.\par \par CHIEF COMPLAINT:\par Today she is feeling generally well and does not have any complaints at this time. \par \par She remains on metoprolol succinate 25 mg daily, aspirin 81 mg daily, Plavix 75 mg daily, fish oil 1 tablet 1200 mg daily and atorvastatin 20 mg daily.\par \par She denies fever, chills, weight loss, malaise, rash, alteration bowel habits, weakness, abdominal  pain, bloating, changes in urination, visual disturbances, chest pain, headaches, dizziness, heart palpitations, recent episodes of syncope or falls at this time.\par \par

## 2022-09-26 NOTE — ASSESSMENT
[FreeTextEntry1] : This is a 77 year year old female here today for follow up cardiac evaluation. \par She has a past medical history significant for GERD/dyspepsia, history of thalassemia, shortness of breath with walking up stairs, "abnormal electrocardiogram", CAD and s/p stent on 9/2/22.\par \par CHIEF COMPLAINT:\par Today she is feeling generally well and does not have any complaints at this time. \par \par She remains on metoprolol succinate 25 mg daily, aspirin 81 mg daily, Plavix 75 mg daily, fish oil 1 tablet 1200 mg daily and atorvastatin 20 mg daily.\par \par She denies fever, chills, weight loss, malaise, rash, alteration bowel habits, weakness, abdominal  pain, bloating, changes in urination, visual disturbances, chest pain, headaches, dizziness, heart palpitations, recent episodes of syncope or falls at this time.\par \par *She is going to [place] to visit her son next week and wanted to make sure she was okay to fly.  I remind her to wear her compression stockings and to take her aspirin and Plavix daily.  She thinks that she may have gotten a rash on her leg from Plavix however during the exam there was no rash present.  She does admit to feeling fatigued during the day and takes her metoprolol in the morning.  I advised that she take this pill in the evening time instead.\par \par BLOOD PRESSURE:\par -BP is well controlled in today's visit.\par \par -I have discussed the importance of maintaining good BP control and reviewed the newest guidelines with the patient while re-enforcing dietary sodium restrictions to no more than 2-3 g daily, DASH diet, life style modifications as well as the goal of maintaining ideal body weight with the patient today. I have advised the patient to avoid the use of over-the-counter medications/ supplements especially NSAIDS.\par \par I have reviewed with Ms. RODRIGUEZ that serious health consequences can occur when blood pressure is not well controlled and the need for strict compliance with medication and that optimal control can significantly reduce the risk of cardiovascular disease stroke, heart attack and other organ damage. They verbally expressed understanding of the fore mentioned serious health consequences to me today.\par \par BLOOD WORK:\par -New blood work was done 09/26/2022  to evaluate lipid profile, CBC, BMP, hepatic function, A1C and TSH. \par \par -New blood work was done August 29, 2022 which demonstrated direct .  Her LDL goal needs to be 70 or less due to her coronary artery disease and status post cardiac stent.\par \par *Pt had a severe allergic reaction to this medication and stopped it. She states that she was on Lipitor in the past with no reaction. While on rosuvastatin she stated that she was getting severe chest pains, back pain with difficulty breathing.\par \par CHOLESTEROL CONTROL:\par -Patient will continue the advised  TLC diet and to continue follow-up for treatment of hyperlipidemia and repeat blood testing with diet and exercise. I have discussed different exercises and the importance of maintenance of optimal body weight. The importance of staying within guidelines and recommendations was stressed to the patient today and they acknowledged that they understand this to me verbally.\par \par  -Ms. RODRIGUEZ was educated and advised that failure to follow-up with my medical recommendations to lower cholesterol can result in severe health consequences therefore, they will continuing a low saturated and low fat diet and to avoid excessive carbohydrates to help reduce triglycerides and that lowering LDL levels is associated with a significant decrease in serious cardiac events including but not limited to heart attack stroke and overall death. We will continue lipid lowering agents as advised based on blood test results and the patient understands to call if they develop severe muscle discomfort or if they have a reddish tinted discoloration in their urine.\par \par CAD:\par The patient is currently stable and they are compliant taking their antiplatelet medications as well as lipid-lowering agents. At this time, there is no definite subjective or objective evidence of active, uncontrolled coronary ischemia, congestive heart failure, or ventricular arrhythmia. The patient remains on therapy for hyperlipidemia and LDL goal is 70 or less.  The patient was educated on signs and symptoms for worsening coronary artery disease such as new onset chest pain and/or dyspnea on exertion.  They were educated on their antiplatelet medications/risk for bleeding and it was re-enforced that they should make us aware if any spontaneous bleeding were to occur.The importance of complying with prior medical recommendations for prevention of further cardiac events was stressed to the patient today as well as maintaining healthy diet, increasing exercise and ideal body weight\par \par TESTING/REPORTS:\par -EKG done Sep 09, 2022 which demonstrated regular sinus rhythm with nonspecific ST-T wave changes, BPM of 71.\par \par -Cardiac catheterization done September 2, 2022.  She received a successful stent of mid LAD and balloon of D1 artery.  There was a 95% stenosis in the mid left anterior descending and 100% stenosis in the first diagonal artery.  She is now currently on aspirin 81 mg daily and Plavix 75 mg daily for dual antiplatelet therapy.\par \par -Echocardiogram done August 29, 2022 demonstrated mild mitral, tricuspid, aortic regurgitation with minimal pulmonic regurgitation.  Borderline pulmonary pressures with normal left ventricular systolic function ejection fraction of 65%.\par \par -The patient had an abnormal exercise stress test August 29, 2022 consistent myocardial ischemia.  She had 1.5 mm ST changes in leads II, III, aVF and V4 to V6 associated with exertional chest pressure.\par \par -Electrocardiogram done June 16, 2022 demonstrated normal sinus rhythm rate 72 bpm is otherwise remarkable for T wave inversion in V1 through V3.\par \par PLAN:\par -She will continue with her her current medications and will contact the office if she is having any complaints between now and their next follow up appointment.\par -She will follow-up with her pulmonary to further evaluate her dyspnea on exertion.  At this time she is clinically stable and does not appear in distress on exam.\par \par I have discussed the plan of care with Ms. WALI RODRIGUEZ  and she  will follow up in 3 months. She is compliant with all of her medications.\par \par The patient understands that aerobic exercises must be increased to minutes 4 times/week and a detailed discussion of lifestyle modification was done today. \par The patient has a good understanding of the diagnosis, treatment plan and lifestyle modification. \par She will contact me at the office for any questions with their care or any changes in their health status.\par \par \par Nelly ACUNA

## 2022-09-28 RX ORDER — ASPIRIN 81 MG
81 TABLET, DELAYED RELEASE (ENTERIC COATED) ORAL DAILY
Refills: 0 | Status: ACTIVE | COMMUNITY

## 2022-09-29 ENCOUNTER — NON-APPOINTMENT (OUTPATIENT)
Age: 77
End: 2022-09-29

## 2022-10-27 ENCOUNTER — APPOINTMENT (OUTPATIENT)
Dept: CARDIOLOGY | Facility: CLINIC | Age: 77
End: 2022-10-27

## 2022-11-28 ENCOUNTER — RX RENEWAL (OUTPATIENT)
Age: 77
End: 2022-11-28

## 2022-12-01 ENCOUNTER — APPOINTMENT (OUTPATIENT)
Dept: CARDIOLOGY | Facility: CLINIC | Age: 77
End: 2022-12-01

## 2022-12-01 VITALS
SYSTOLIC BLOOD PRESSURE: 125 MMHG | HEART RATE: 80 BPM | HEIGHT: 62 IN | BODY MASS INDEX: 22.82 KG/M2 | WEIGHT: 124 LBS | OXYGEN SATURATION: 98 % | RESPIRATION RATE: 15 BRPM | DIASTOLIC BLOOD PRESSURE: 79 MMHG

## 2022-12-01 PROCEDURE — 99215 OFFICE O/P EST HI 40 MIN: CPT

## 2022-12-01 NOTE — ASSESSMENT
[FreeTextEntry1] : Prior note nurse practitioner Jean Pierre::\par \par This is a 77 year year old female here today for follow up cardiac evaluation. \par She has a past medical history significant for GERD/dyspepsia, history of thalassemia, shortness of breath with walking up stairs, "abnormal electrocardiogram", CAD and s/p stent on 9/2/22.\par \par CHIEF COMPLAINT:\par Today she is feeling generally well and does not have any complaints at this time. \par \par She remains on metoprolol succinate 25 mg daily, aspirin 81 mg daily, Plavix 75 mg daily, fish oil 1 tablet 1200 mg daily and atorvastatin 20 mg daily.\par \par She denies fever, chills, weight loss, malaise, rash, alteration bowel habits, weakness, abdominal  pain, bloating, changes in urination, visual disturbances, chest pain, headaches, dizziness, heart palpitations, recent episodes of syncope or falls at this time.\par \par *She is going to [place] to visit her son next week and wanted to make sure she was okay to fly.  I remind her to wear her compression stockings and to take her aspirin and Plavix daily.  She thinks that she may have gotten a rash on her leg from Plavix however during the exam there was no rash present.  She does admit to feeling fatigued during the day and takes her metoprolol in the morning.  I advised that she take this pill in the evening time instead.\par \par BLOOD PRESSURE:\par -BP is well controlled in today's visit.\par \par -I have discussed the importance of maintaining good BP control and reviewed the newest guidelines with the patient while re-enforcing dietary sodium restrictions to no more than 2-3 g daily, DASH diet, life style modifications as well as the goal of maintaining ideal body weight with the patient today. I have advised the patient to avoid the use of over-the-counter medications/ supplements especially NSAIDS.\par \par I have reviewed with Ms. RODRIGUEZ that serious health consequences can occur when blood pressure is not well controlled and the need for strict compliance with medication and that optimal control can significantly reduce the risk of cardiovascular disease stroke, heart attack and other organ damage. They verbally expressed understanding of the fore mentioned serious health consequences to me today.\par \par BLOOD WORK:\par -New blood work was done 09/26/2022  to evaluate lipid profile, CBC, BMP, hepatic function, A1C and TSH. \par \par -New blood work was done August 29, 2022 which demonstrated direct .  Her LDL goal needs to be 70 or less due to her coronary artery disease and status post cardiac stent.\par \par *Pt had a severe allergic reaction to this medication and stopped it. She states that she was on Lipitor in the past with no reaction. While on rosuvastatin she stated that she was getting severe chest pains, back pain with difficulty breathing.\par \par CHOLESTEROL CONTROL:\par -Patient will continue the advised  TLC diet and to continue follow-up for treatment of hyperlipidemia and repeat blood testing with diet and exercise. I have discussed different exercises and the importance of maintenance of optimal body weight. The importance of staying within guidelines and recommendations was stressed to the patient today and they acknowledged that they understand this to me verbally.\par \par  -Ms. RODRIGUEZ was educated and advised that failure to follow-up with my medical recommendations to lower cholesterol can result in severe health consequences therefore, they will continuing a low saturated and low fat diet and to avoid excessive carbohydrates to help reduce triglycerides and that lowering LDL levels is associated with a significant decrease in serious cardiac events including but not limited to heart attack stroke and overall death. We will continue lipid lowering agents as advised based on blood test results and the patient understands to call if they develop severe muscle discomfort or if they have a reddish tinted discoloration in their urine.\par \par CAD:\par The patient is currently stable and they are compliant taking their antiplatelet medications as well as lipid-lowering agents. At this time, there is no definite subjective or objective evidence of active, uncontrolled coronary ischemia, congestive heart failure, or ventricular arrhythmia. The patient remains on therapy for hyperlipidemia and LDL goal is 70 or less.  The patient was educated on signs and symptoms for worsening coronary artery disease such as new onset chest pain and/or dyspnea on exertion.  They were educated on their antiplatelet medications/risk for bleeding and it was re-enforced that they should make us aware if any spontaneous bleeding were to occur.The importance of complying with prior medical recommendations for prevention of further cardiac events was stressed to the patient today as well as maintaining healthy diet, increasing exercise and ideal body weight\par \par TESTING/REPORTS:\par -EKG done Sep 09, 2022 which demonstrated regular sinus rhythm with nonspecific ST-T wave changes, BPM of 71.\par \par -Cardiac catheterization done September 2, 2022.  She received a successful stent of mid LAD and balloon of D1 artery.  There was a 95% stenosis in the mid left anterior descending and 100% stenosis in the first diagonal artery.  She is now currently on aspirin 81 mg daily and Plavix 75 mg daily for dual antiplatelet therapy.\par \par -Echocardiogram done August 29, 2022 demonstrated mild mitral, tricuspid, aortic regurgitation with minimal pulmonic regurgitation.  Borderline pulmonary pressures with normal left ventricular systolic function ejection fraction of 65%.\par \par -The patient had an abnormal exercise stress test August 29, 2022 consistent myocardial ischemia.  She had 1.5 mm ST changes in leads II, III, aVF and V4 to V6 associated with exertional chest pressure.\par \par -Electrocardiogram done June 16, 2022 demonstrated normal sinus rhythm rate 72 bpm is otherwise remarkable for T wave inversion in V1 through V3.\par \par PLAN:\par -She will continue with her her current medications and will contact the office if she is having any complaints between now and their next follow up appointment.\par -She will follow-up with her pulmonary to further evaluate her dyspnea on exertion.  At this time she is clinically stable and does not appear in distress on exam.\par \par I have discussed the plan of care with Ms. WALI RODRIGUEZ  and she  will follow up in 3 months. She is compliant with all of her medications.\par \par The patient understands that aerobic exercises must be increased to minutes 4 times/week and a detailed discussion of lifestyle modification was done today. \par The patient has a good understanding of the diagnosis, treatment plan and lifestyle modification. \par She will contact me at the office for any questions with their care or any changes in their health status.\par \par \par Nelly ACUNA

## 2022-12-01 NOTE — DISCUSSION/SUMMARY
[FreeTextEntry1] : This is a 77-year-old female with past medical history significant for coronary artery disease, status post stent to the left anterior descending artery and angioplasty of first diagonal artery September 2, 2022, with normal left main artery, normal left circumflex artery, and minor irregularities in the right coronary artery.,  GERD/dyspepsia, history of thalassemia, shortness of breath with walking up stairs, "abnormal electrocardiogram", who comes in for cardiac follow-up evaluation.\par She denies chest pain, shortness of breath, dizziness or syncope.\par Her cardiac risk factors include hypercholesterolemia.\par The patient remains on dual antiplatelet therapy with aspirin and Plavix and will do so for the next 9 to 12 months.\par She will follow-up with me in 3 months.  She is encouraged to continue on her daily aerobic walking program for 20 minutes to 40 minutes 4 times per week.\par PMH:\par Patient had abnormal exercise stress test August 29, 2022 consistent myocardial ischemia.  She had 1.5 mm ST changes in leads II, III, aVF and V4 to V6 associated with exertional chest pressure.\par She is motivated to work with our registered dietitian.\par Electrocardiogram done June 16, 2022 demonstrated normal sinus rhythm rate 72 bpm is otherwise remarkable for T wave inversion in V1 through V3.\par Nonfasting blood work done September 26, 2022 demonstrated a cholesterol 135, HDL 42, triglycerides of 208, LDL of 51, non-HDL cholesterol 92 mg/dL and LDL direct of 93 mg/dL.\par Blood work done April 24, 2022 demonstrated cholesterol 216, triglycerides 290, HDL of 41, LDL calculated 117 mg/dL and hemoglobin A1c of 5.3.\par \par She will follow-up with me after above-noted diagnostic procedure is completed.\par \par The patient understands that aerobic exercises must be increased to 40 minutes 4 times per week. A detailed discussion of lifestyle modification was done today. The patient has a good understanding of the diagnosis, and treatment plan. Lifestyle modification was also outlined.

## 2022-12-15 ENCOUNTER — APPOINTMENT (OUTPATIENT)
Dept: CARDIOLOGY | Facility: CLINIC | Age: 77
End: 2022-12-15

## 2023-04-07 ENCOUNTER — LABORATORY RESULT (OUTPATIENT)
Age: 78
End: 2023-04-07

## 2023-04-07 ENCOUNTER — NON-APPOINTMENT (OUTPATIENT)
Age: 78
End: 2023-04-07

## 2023-04-07 ENCOUNTER — APPOINTMENT (OUTPATIENT)
Dept: CARDIOLOGY | Facility: CLINIC | Age: 78
End: 2023-04-07
Payer: MEDICARE

## 2023-04-07 VITALS
WEIGHT: 123 LBS | TEMPERATURE: 97.7 F | BODY MASS INDEX: 22.63 KG/M2 | HEART RATE: 67 BPM | SYSTOLIC BLOOD PRESSURE: 128 MMHG | OXYGEN SATURATION: 98 % | RESPIRATION RATE: 16 BRPM | DIASTOLIC BLOOD PRESSURE: 81 MMHG | HEIGHT: 62 IN

## 2023-04-07 PROCEDURE — 93000 ELECTROCARDIOGRAM COMPLETE: CPT

## 2023-04-07 PROCEDURE — 99214 OFFICE O/P EST MOD 30 MIN: CPT

## 2023-04-07 NOTE — DISCUSSION/SUMMARY
[FreeTextEntry1] : This is a 77-year-old female with past medical history significant for coronary artery disease, status post stent to the left anterior descending artery and angioplasty of first diagonal artery September 2, 2022, with normal left main artery, normal left circumflex artery, and minor irregularities in the right coronary artery, GERD/dyspepsia, status post COVID-19 infection October 2022, history of thalassemia, shortness of breath with walking up stairs, "abnormal electrocardiogram", who comes in for cardiac follow-up evaluation.\par She denies chest pain, shortness of breath, dizziness or syncope.\par Her cardiac risk factors include hypercholesterolemia.\par Electrocardiogram done April 7, 2023 demonstrated normal sinus rhythm rate 67 bpm is otherwise remarkable for nonspecific ST wave changes.\par New blood work will be done today for lipid panel.  Her LDL target remains less than 70 mg/dL.\par The patient will continue on her current diet and walking program.  She remains hemodynamically stable and asymptomatic from a cardiac standpoint.\par She is encouraged to continue on her daily aerobic walking program for 20 minutes to 40 minutes 4 times per week.\par PMH:\par Patient had abnormal exercise stress test August 29, 2022 consistent myocardial ischemia.  She had 1.5 mm ST changes in leads II, III, aVF and V4 to V6 associated with exertional chest pressure.\par She is motivated to work with our registered dietitian.\par Electrocardiogram done June 16, 2022 demonstrated normal sinus rhythm rate 72 bpm is otherwise remarkable for T wave inversion in V1 through V3.\par Nonfasting blood work done September 26, 2022 demonstrated a cholesterol 135, HDL 42, triglycerides of 208, LDL of 51, non-HDL cholesterol 92 mg/dL and LDL direct of 93 mg/dL.\par Blood work done April 24, 2022 demonstrated cholesterol 216, triglycerides 290, HDL of 41, LDL calculated 117 mg/dL and hemoglobin A1c of 5.3.\par \par She will follow-up with me after above-noted diagnostic procedure is completed.\par \par The patient understands that aerobic exercises must be increased to 40 minutes 4 times per week. A detailed discussion of lifestyle modification was done today. The patient has a good understanding of the diagnosis, and treatment plan. Lifestyle modification was also outlined.

## 2023-06-15 ENCOUNTER — NON-APPOINTMENT (OUTPATIENT)
Age: 78
End: 2023-06-15

## 2023-09-15 ENCOUNTER — LABORATORY RESULT (OUTPATIENT)
Age: 78
End: 2023-09-15

## 2023-09-15 ENCOUNTER — APPOINTMENT (OUTPATIENT)
Dept: CARDIOLOGY | Facility: CLINIC | Age: 78
End: 2023-09-15
Payer: MEDICARE

## 2023-09-15 ENCOUNTER — NON-APPOINTMENT (OUTPATIENT)
Age: 78
End: 2023-09-15

## 2023-09-15 VITALS
HEART RATE: 69 BPM | HEIGHT: 62 IN | BODY MASS INDEX: 22.82 KG/M2 | SYSTOLIC BLOOD PRESSURE: 126 MMHG | RESPIRATION RATE: 16 BRPM | WEIGHT: 124 LBS | OXYGEN SATURATION: 98 % | TEMPERATURE: 97.3 F | DIASTOLIC BLOOD PRESSURE: 82 MMHG

## 2023-09-15 PROCEDURE — 99214 OFFICE O/P EST MOD 30 MIN: CPT

## 2023-09-15 PROCEDURE — 93000 ELECTROCARDIOGRAM COMPLETE: CPT

## 2023-09-15 RX ORDER — CLOPIDOGREL BISULFATE 75 MG/1
75 TABLET, FILM COATED ORAL
Qty: 90 | Refills: 1 | Status: COMPLETED | COMMUNITY
Start: 2022-09-09 | End: 2023-09-15

## 2023-11-09 RX ORDER — DEXLANSOPRAZOLE 60 MG/1
60 CAPSULE, DELAYED RELEASE ORAL DAILY
Refills: 0 | Status: DISCONTINUED | COMMUNITY
End: 2023-09-15

## 2024-01-09 ENCOUNTER — RX RENEWAL (OUTPATIENT)
Age: 79
End: 2024-01-09

## 2024-01-11 RX ORDER — METOPROLOL SUCCINATE 25 MG/1
25 TABLET, EXTENDED RELEASE ORAL
Qty: 90 | Refills: 1 | Status: ACTIVE | COMMUNITY
Start: 2022-08-29 | End: 1900-01-01

## 2024-02-16 ENCOUNTER — LABORATORY RESULT (OUTPATIENT)
Age: 79
End: 2024-02-16

## 2024-02-16 ENCOUNTER — APPOINTMENT (OUTPATIENT)
Dept: CARDIOLOGY | Facility: CLINIC | Age: 79
End: 2024-02-16
Payer: MEDICARE

## 2024-02-16 VITALS
SYSTOLIC BLOOD PRESSURE: 124 MMHG | HEIGHT: 62 IN | HEART RATE: 52 BPM | WEIGHT: 124 LBS | TEMPERATURE: 97.4 F | OXYGEN SATURATION: 99 % | BODY MASS INDEX: 22.82 KG/M2 | RESPIRATION RATE: 16 BRPM | DIASTOLIC BLOOD PRESSURE: 77 MMHG

## 2024-02-16 DIAGNOSIS — I25.10 ATHEROSCLEROTIC HEART DISEASE OF NATIVE CORONARY ARTERY W/OUT ANGINA PECTORIS: ICD-10-CM

## 2024-02-16 DIAGNOSIS — I65.29 OCCLUSION AND STENOSIS OF UNSPECIFIED CAROTID ARTERY: ICD-10-CM

## 2024-02-16 DIAGNOSIS — R01.1 CARDIAC MURMUR, UNSPECIFIED: ICD-10-CM

## 2024-02-16 DIAGNOSIS — Z95.5 PRESENCE OF CORONARY ANGIOPLASTY IMPLANT AND GRAFT: ICD-10-CM

## 2024-02-16 DIAGNOSIS — E78.00 PURE HYPERCHOLESTEROLEMIA, UNSPECIFIED: ICD-10-CM

## 2024-02-16 PROCEDURE — G2211 COMPLEX E/M VISIT ADD ON: CPT

## 2024-02-16 PROCEDURE — 99214 OFFICE O/P EST MOD 30 MIN: CPT

## 2024-02-16 RX ORDER — ICOSAPENT ETHYL 1 G/1
1 CAPSULE ORAL TWICE DAILY
Qty: 360 | Refills: 1 | Status: ACTIVE | COMMUNITY
Start: 2024-02-16 | End: 1900-01-01

## 2024-02-16 NOTE — DISCUSSION/SUMMARY
[FreeTextEntry1] : This is a 78-year-old female with past medical history significant for coronary artery disease, status post stent to the left anterior descending artery and angioplasty of first diagonal artery September 2, 2022, with normal left main artery, normal left circumflex artery, and minor irregularities in the right coronary artery, GERD/dyspepsia, status post COVID-19 infection October 2022, history of thalassemia, shortness of breath with walking up stairs, "abnormal electrocardiogram", who comes in for cardiac follow-up evaluation. She denies chest pain, shortness of breath, dizziness or syncope.   The patient will have new blood work done today for LDL, SMA 20, lipoprotein B, and TSH. Lipid panel done September 15, 2023 demonstrated a cholesterol 149, HDL 46, triglycerides 201, LDL direct 62 mg/dL and non-HDL cholesterol 103 mg/dL. This patient's LDL target is less than 70 mg/dL. She is taking over-the-counter fish oil and would benefit from generic Vascepa therapy.  She will continue on her daily walking. She had an ankle-brachial index done through her primary care physician's office.  She was told that her right lower extremity, had a abnormal measurement of 1.4 to and her left side had an VIBHA of 1.33.  I have reassured her that neither of these findings is significant.  However she is concerned about overall coldness in the left lower extremity.  I have asked to make an appoint with Dr. Harvey of vascular surgery to address this finding. She is currently hemodynamically stable and asymptomatic from a cardiac standpoint. She make an appointment for an echo Doppler examination to evaluate her left ventricular function, chamber size, murmur, and rule out hypertrophy.  I have also recommended she schedule carotid Doppler examination. She will follow-up with me after above-noted diagnostic tests are completed Her cardiac risk factors include hypercholesterolemia. Electrocardiogram done September 15, 2023 demonstrated normal sinus rhythm rate 68 bpm is otherwise remarkable for left atrial abnormality nonspecific ST wave flattening.  The patient has past the anniversary of her stent September 2, 2022 (drug-eluting stent to the left anterior descending artery and angioplasty of the first diagonal branch 95% stenosis and 100% stenosis in the first diagonal) and may now discontinue Plavix and continue aspirin 81 mg after dinner.  Electrocardiogram done April 7, 2023 demonstrated normal sinus rhythm rate 67 bpm is otherwise remarkable for nonspecific ST wave changes. New blood work will be done today for lipid panel.  Her LDL target remains less than 70 mg/dL. The patient will continue on her current diet and walking program.  She remains hemodynamically stable and asymptomatic from a cardiac standpoint. She is encouraged to continue on her daily aerobic walking program for 20 minutes to 40 minutes 4 times per week. PMH: Patient had abnormal exercise stress test August 29, 2022 consistent myocardial ischemia.  She had 1.5 mm ST changes in leads II, III, aVF and V4 to V6 associated with exertional chest pressure. She is motivated to work with our registered dietitian. Electrocardiogram done June 16, 2022 demonstrated normal sinus rhythm rate 72 bpm is otherwise remarkable for T wave inversion in V1 through V3. Non-fasting blood work done September 26, 2022 demonstrated a cholesterol 135, HDL 42, triglycerides of 208, LDL of 51, non-HDL cholesterol 92 mg/dL and LDL direct of 93 mg/dL. Blood work done April 24, 2022 demonstrated cholesterol 216, triglycerides 290, HDL of 41, LDL calculated 117 mg/dL and hemoglobin A1c of 5.3.  The patient understands that aerobic exercises must be increased to 40 minutes 4 times per week. A detailed discussion of lifestyle modification was done today. The patient has a good understanding of the diagnosis, and treatment plan. Lifestyle modification was also outlined.

## 2024-02-16 NOTE — REASON FOR VISIT
[CV Risk Factors and Non-Cardiac Disease] : CV risk factors and non-cardiac disease [Hyperlipidemia] : hyperlipidemia [Other: ____] : [unfilled] [FreeTextEntry3] : Dr. Mujica [FreeTextEntry1] : This is a 78 year year old female here today for follow up cardiac evaluation.  She has a past medical history significant for GERD/dyspepsia, history of thalassemia, shortness of breath with walking up stairs, "abnormal electrocardiogram", CAD and s/p stent on 9/2/22. She remains on metoprolol succinate 25 mg daily, aspirin 81 mg daily, Plavix 75 mg daily, fish oil 1 tablet 1200 mg daily and atorvastatin 20 mg daily. She denies fever, chills, weight loss, malaise, rash, alteration bowel habits, weakness, abdominal  pain, bloating, changes in urination, visual disturbances, chest pain, headaches, dizziness, heart palpitations, recent episodes of syncope or falls at this time.

## 2024-02-16 NOTE — ASSESSMENT
[FreeTextEntry1] : Prior note nurse practitioner Jean Pierre September 26, 2022::\par  \par  This is a 77 year year old female here today for follow up cardiac evaluation. \par  She has a past medical history significant for GERD/dyspepsia, history of thalassemia, shortness of breath with walking up stairs, "abnormal electrocardiogram", CAD and s/p stent on 9/2/22.\par  \par  CHIEF COMPLAINT:\par  Today she is feeling generally well and does not have any complaints at this time. \par  \par  She remains on metoprolol succinate 25 mg daily, aspirin 81 mg daily, Plavix 75 mg daily, fish oil 1 tablet 1200 mg daily and atorvastatin 20 mg daily.\par  \par  She denies fever, chills, weight loss, malaise, rash, alteration bowel habits, weakness, abdominal  pain, bloating, changes in urination, visual disturbances, chest pain, headaches, dizziness, heart palpitations, recent episodes of syncope or falls at this time.\par  \par  *She is going to [place] to visit her son next week and wanted to make sure she was okay to fly.  I remind her to wear her compression stockings and to take her aspirin and Plavix daily.  She thinks that she may have gotten a rash on her leg from Plavix however during the exam there was no rash present.  She does admit to feeling fatigued during the day and takes her metoprolol in the morning.  I advised that she take this pill in the evening time instead.\par  \par  BLOOD PRESSURE:\par  -BP is well controlled in today's visit.\par  \par  -I have discussed the importance of maintaining good BP control and reviewed the newest guidelines with the patient while re-enforcing dietary sodium restrictions to no more than 2-3 g daily, DASH diet, life style modifications as well as the goal of maintaining ideal body weight with the patient today. I have advised the patient to avoid the use of over-the-counter medications/ supplements especially NSAIDS.\par  \par  I have reviewed with MsJyothi MICHAEL that serious health consequences can occur when blood pressure is not well controlled and the need for strict compliance with medication and that optimal control can significantly reduce the risk of cardiovascular disease stroke, heart attack and other organ damage. They verbally expressed understanding of the fore mentioned serious health consequences to me today.\par  \par  BLOOD WORK:\par  -New blood work was done 09/26/2022  to evaluate lipid profile, CBC, BMP, hepatic function, A1C and TSH. \par  \par  -New blood work was done August 29, 2022 which demonstrated direct .  Her LDL goal needs to be 70 or less due to her coronary artery disease and status post cardiac stent.\par  \par  *Pt had a severe allergic reaction to this medication and stopped it. She states that she was on Lipitor in the past with no reaction. While on rosuvastatin she stated that she was getting severe chest pains, back pain with difficulty breathing.\par  \par  CHOLESTEROL CONTROL:\par  -Patient will continue the advised  TLC diet and to continue follow-up for treatment of hyperlipidemia and repeat blood testing with diet and exercise. I have discussed different exercises and the importance of maintenance of optimal body weight. The importance of staying within guidelines and recommendations was stressed to the patient today and they acknowledged that they understand this to me verbally.\par  \par   -Ms. RODRIGUEZ was educated and advised that failure to follow-up with my medical recommendations to lower cholesterol can result in severe health consequences therefore, they will continuing a low saturated and low fat diet and to avoid excessive carbohydrates to help reduce triglycerides and that lowering LDL levels is associated with a significant decrease in serious cardiac events including but not limited to heart attack stroke and overall death. We will continue lipid lowering agents as advised based on blood test results and the patient understands to call if they develop severe muscle discomfort or if they have a reddish tinted discoloration in their urine.\par  \par  CAD:\par  The patient is currently stable and they are compliant taking their antiplatelet medications as well as lipid-lowering agents. At this time, there is no definite subjective or objective evidence of active, uncontrolled coronary ischemia, congestive heart failure, or ventricular arrhythmia. The patient remains on therapy for hyperlipidemia and LDL goal is 70 or less.  The patient was educated on signs and symptoms for worsening coronary artery disease such as new onset chest pain and/or dyspnea on exertion.  They were educated on their antiplatelet medications/risk for bleeding and it was re-enforced that they should make us aware if any spontaneous bleeding were to occur.The importance of complying with prior medical recommendations for prevention of further cardiac events was stressed to the patient today as well as maintaining healthy diet, increasing exercise and ideal body weight\par  \par  TESTING/REPORTS:\par  -EKG done Sep 09, 2022 which demonstrated regular sinus rhythm with nonspecific ST-T wave changes, BPM of 71.\par  \par  -Cardiac catheterization done September 2, 2022.  She received a successful stent of mid LAD and balloon of D1 artery.  There was a 95% stenosis in the mid left anterior descending and 100% stenosis in the first diagonal artery.  She is now currently on aspirin 81 mg daily and Plavix 75 mg daily for dual antiplatelet therapy.\par  \par  -Echocardiogram done August 29, 2022 demonstrated mild mitral, tricuspid, aortic regurgitation with minimal pulmonic regurgitation.  Borderline pulmonary pressures with normal left ventricular systolic function ejection fraction of 65%.\par  \par  -The patient had an abnormal exercise stress test August 29, 2022 consistent myocardial ischemia.  She had 1.5 mm ST changes in leads II, III, aVF and V4 to V6 associated with exertional chest pressure.\par  \par  -Electrocardiogram done June 16, 2022 demonstrated normal sinus rhythm rate 72 bpm is otherwise remarkable for T wave inversion in V1 through V3.\par  \par  PLAN:\par  -She will continue with her her current medications and will contact the office if she is having any complaints between now and their next follow up appointment.\par  -She will follow-up with her pulmonary to further evaluate her dyspnea on exertion.  At this time she is clinically stable and does not appear in distress on exam.\par  \par  I have discussed the plan of care with Ms. WALI RODRIGUEZ  and she  will follow up in 3 months. She is compliant with all of her medications.\par  \par  The patient understands that aerobic exercises must be increased to minutes 4 times/week and a detailed discussion of lifestyle modification was done today. \par  The patient has a good understanding of the diagnosis, treatment plan and lifestyle modification. \par  She will contact me at the office for any questions with their care or any changes in their health status.\par  \par  \par  Nelly ACUNA

## 2024-02-22 RX ORDER — ATORVASTATIN CALCIUM 40 MG/1
40 TABLET, FILM COATED ORAL
Qty: 90 | Refills: 1 | Status: ACTIVE | COMMUNITY
Start: 2022-09-09 | End: 1900-01-01

## 2024-04-10 ENCOUNTER — APPOINTMENT (OUTPATIENT)
Dept: VASCULAR SURGERY | Facility: CLINIC | Age: 79
End: 2024-04-10
Payer: MEDICARE

## 2024-04-10 VITALS
HEIGHT: 62 IN | BODY MASS INDEX: 22.63 KG/M2 | HEART RATE: 66 BPM | DIASTOLIC BLOOD PRESSURE: 80 MMHG | WEIGHT: 123 LBS | TEMPERATURE: 97.9 F | SYSTOLIC BLOOD PRESSURE: 134 MMHG

## 2024-04-10 PROCEDURE — 93971 EXTREMITY STUDY: CPT | Mod: LT

## 2024-04-10 PROCEDURE — 99203 OFFICE O/P NEW LOW 30 MIN: CPT

## 2024-04-10 NOTE — HISTORY OF PRESENT ILLNESS
[FreeTextEntry1] : 78F presents as a consultation for left leg pain and previous "abnormal" studies from outside facility. She complains mostly of left leg sx of pain, achiness, tiredness, and heaviness. Patient has CAD and prev stents, HTN, HLD, no DM. No hx of smoking, Denies swelling of legs, claudication, rest pain, or tissue loss.  She had giovanny/pvr at [facility] showing slightly diminished waveforms of RLE but otherwise normal. Left side was normal. Otherwise, hx of rf for pad or venous insufficiency.

## 2024-04-10 NOTE — PHYSICAL EXAM
[Respiratory Effort] : normal respiratory effort [Normal Rate and Rhythm] : normal rate and rhythm [2+] : left 2+ [No Rash or Lesion] : No rash or lesion [Ankle Swelling (On Exam)] : not present [Varicose Veins Of Lower Extremities] : not present [] : not present [Purpura] : no purpura  [Petechiae] : no petechiae [Skin Ulcer] : no ulcer [Skin Induration] : no induration [de-identified] : NAD [de-identified] : Normal

## 2024-04-18 NOTE — PATIENT PROFILE ADULT - NSPROGENARRIVEDFROM_GEN_A_NUR
previous_has_had_a_previous_facial When Outside In The Sun, Do You...: mostly burns, rarely tans home

## 2024-07-11 ENCOUNTER — NON-APPOINTMENT (OUTPATIENT)
Age: 79
End: 2024-07-11

## 2024-07-11 ENCOUNTER — APPOINTMENT (OUTPATIENT)
Dept: CARDIOLOGY | Facility: CLINIC | Age: 79
End: 2024-07-11
Payer: MEDICARE

## 2024-07-11 VITALS
BODY MASS INDEX: 22.82 KG/M2 | RESPIRATION RATE: 16 BRPM | TEMPERATURE: 97.8 F | SYSTOLIC BLOOD PRESSURE: 118 MMHG | HEART RATE: 61 BPM | DIASTOLIC BLOOD PRESSURE: 78 MMHG | OXYGEN SATURATION: 99 % | HEIGHT: 62 IN | WEIGHT: 124 LBS

## 2024-07-11 VITALS — SYSTOLIC BLOOD PRESSURE: 118 MMHG | DIASTOLIC BLOOD PRESSURE: 70 MMHG

## 2024-07-11 DIAGNOSIS — Z95.5 PRESENCE OF CORONARY ANGIOPLASTY IMPLANT AND GRAFT: ICD-10-CM

## 2024-07-11 DIAGNOSIS — R06.09 OTHER FORMS OF DYSPNEA: ICD-10-CM

## 2024-07-11 DIAGNOSIS — E78.00 PURE HYPERCHOLESTEROLEMIA, UNSPECIFIED: ICD-10-CM

## 2024-07-11 DIAGNOSIS — R01.1 CARDIAC MURMUR, UNSPECIFIED: ICD-10-CM

## 2024-07-11 DIAGNOSIS — I25.10 ATHEROSCLEROTIC HEART DISEASE OF NATIVE CORONARY ARTERY W/OUT ANGINA PECTORIS: ICD-10-CM

## 2024-07-11 PROCEDURE — 93306 TTE W/DOPPLER COMPLETE: CPT

## 2024-07-11 PROCEDURE — G2211 COMPLEX E/M VISIT ADD ON: CPT

## 2024-07-11 PROCEDURE — 93880 EXTRACRANIAL BILAT STUDY: CPT

## 2024-07-11 PROCEDURE — 93000 ELECTROCARDIOGRAM COMPLETE: CPT

## 2024-07-11 PROCEDURE — 99214 OFFICE O/P EST MOD 30 MIN: CPT

## 2024-11-20 PROBLEM — Z86.79 HISTORY OF CARDIAC MURMUR: Status: RESOLVED | Noted: 2022-06-17 | Resolved: 2024-11-20

## 2024-11-20 PROBLEM — I07.1 MILD TRICUSPID REGURGITATION: Status: ACTIVE | Noted: 2024-11-20

## 2024-11-21 ENCOUNTER — APPOINTMENT (OUTPATIENT)
Dept: CARDIOLOGY | Facility: CLINIC | Age: 79
End: 2024-11-21

## 2024-11-21 VITALS
HEART RATE: 76 BPM | OXYGEN SATURATION: 98 % | BODY MASS INDEX: 23 KG/M2 | WEIGHT: 125 LBS | TEMPERATURE: 98.1 F | RESPIRATION RATE: 16 BRPM | HEIGHT: 62 IN

## 2024-11-21 DIAGNOSIS — Z95.5 PRESENCE OF CORONARY ANGIOPLASTY IMPLANT AND GRAFT: ICD-10-CM

## 2024-11-21 DIAGNOSIS — E78.00 PURE HYPERCHOLESTEROLEMIA, UNSPECIFIED: ICD-10-CM

## 2024-11-21 DIAGNOSIS — R06.09 OTHER FORMS OF DYSPNEA: ICD-10-CM

## 2024-11-21 DIAGNOSIS — I25.10 ATHEROSCLEROTIC HEART DISEASE OF NATIVE CORONARY ARTERY W/OUT ANGINA PECTORIS: ICD-10-CM

## 2024-11-21 DIAGNOSIS — I07.1 RHEUMATIC TRICUSPID INSUFFICIENCY: ICD-10-CM

## 2024-11-21 DIAGNOSIS — Z86.79 PERSONAL HISTORY OF OTHER DISEASES OF THE CIRCULATORY SYSTEM: ICD-10-CM

## 2024-11-21 PROCEDURE — 99213 OFFICE O/P EST LOW 20 MIN: CPT | Mod: 25

## 2024-11-21 PROCEDURE — 93015 CV STRESS TEST SUPVJ I&R: CPT

## 2024-12-02 ENCOUNTER — RX RENEWAL (OUTPATIENT)
Age: 79
End: 2024-12-02

## 2025-05-19 ENCOUNTER — RX RENEWAL (OUTPATIENT)
Age: 80
End: 2025-05-19

## 2025-06-02 ENCOUNTER — RX RENEWAL (OUTPATIENT)
Age: 80
End: 2025-06-02

## 2025-06-26 ENCOUNTER — APPOINTMENT (OUTPATIENT)
Dept: CARDIOLOGY | Facility: CLINIC | Age: 80
End: 2025-06-26

## 2025-07-31 ENCOUNTER — APPOINTMENT (OUTPATIENT)
Age: 80
End: 2025-07-31
Payer: MEDICARE

## 2025-07-31 ENCOUNTER — NON-APPOINTMENT (OUTPATIENT)
Age: 80
End: 2025-07-31

## 2025-07-31 PROCEDURE — 92014 COMPRE OPH EXAM EST PT 1/>: CPT

## 2025-08-15 ENCOUNTER — RX RENEWAL (OUTPATIENT)
Age: 80
End: 2025-08-15